# Patient Record
Sex: MALE | Race: WHITE | NOT HISPANIC OR LATINO | Employment: OTHER | URBAN - METROPOLITAN AREA
[De-identification: names, ages, dates, MRNs, and addresses within clinical notes are randomized per-mention and may not be internally consistent; named-entity substitution may affect disease eponyms.]

---

## 2017-03-09 ENCOUNTER — OFFICE VISIT (OUTPATIENT)
Dept: HEMATOLOGY ONCOLOGY | Facility: MEDICAL CENTER | Age: 57
End: 2017-03-09
Payer: MEDICARE

## 2017-03-09 VITALS
WEIGHT: 207.67 LBS | HEIGHT: 68 IN | HEART RATE: 68 BPM | RESPIRATION RATE: 18 BRPM | TEMPERATURE: 98.4 F | OXYGEN SATURATION: 92 % | BODY MASS INDEX: 31.47 KG/M2 | SYSTOLIC BLOOD PRESSURE: 132 MMHG | DIASTOLIC BLOOD PRESSURE: 84 MMHG

## 2017-03-09 DIAGNOSIS — R91.1 PULMONARY NODULE: ICD-10-CM

## 2017-03-09 PROCEDURE — G8417 CALC BMI ABV UP PARAM F/U: HCPCS | Performed by: INTERNAL MEDICINE

## 2017-03-09 PROCEDURE — 99214 OFFICE O/P EST MOD 30 MIN: CPT | Performed by: INTERNAL MEDICINE

## 2017-03-09 PROCEDURE — 3017F COLORECTAL CA SCREEN DOC REV: CPT | Mod: 1P | Performed by: INTERNAL MEDICINE

## 2017-03-09 PROCEDURE — 1036F TOBACCO NON-USER: CPT | Performed by: INTERNAL MEDICINE

## 2017-03-09 PROCEDURE — G8432 DEP SCR NOT DOC, RNG: HCPCS | Performed by: INTERNAL MEDICINE

## 2017-03-09 PROCEDURE — G8482 FLU IMMUNIZE ORDER/ADMIN: HCPCS | Performed by: INTERNAL MEDICINE

## 2017-03-09 RX ORDER — OXYCODONE HYDROCHLORIDE 10 MG/1
TABLET ORAL
COMMUNITY
Start: 2017-02-27 | End: 2018-11-27

## 2017-03-09 ASSESSMENT — PAIN SCALES - GENERAL: PAINLEVEL: NO PAIN

## 2017-03-09 NOTE — PROGRESS NOTES
Follow Up Note: Oncology     Date: 3/9/17  Time: 10:00 am    Primary care physician: Dr. Cartwright  Radiation oncology: Dr. Naqvi  ENT: Dr. Wood  Surgery: Dr. Frias   Pain management: Dr. Briones  Psychiatry: Dr. 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    History of presenting illness:  Mr. Peters is a 56-year-old male history of basal tongue squamous cell carcinoma in 8/2014. At diagnosis he was found to have thyroid nodule and jugulodigastric adenopathy. Ultrasound of the neck showed a 1.5 center meter lesion in the thyroid as well as left cervical adenopathy. He underwent further workup with a PET scan which showed uptake in the mass of the base of the tongue which was causing airway compromise as well as bilateral neck adenopathy. Secondary to airway compromise he required a tracheostomy and a PEG tube placement. He had been on ventilatory support initially long period of time. He underwent biopsy and pathology was positive for moderate to poorly differentiated squamous cell carcinoma and he was cT3 N2c M0, stage FEDERICO, p16 positive. At the time he was on vent support hence underwent induction chemotherapy with DCF ×2 cycles. The therapy he had fluctuating liver enzymes which were followed as well as neutropenic fevers requiring antibiotic treatment. He was then started on chemoradiation with weekly cisplatin. He tolerated the treatment fairly well and completed treatment on 1/30/15. He had very slow recovery but was eventually recovered. Post therapy PET scan in 5/2016 showed response to therapy with diffuse edema in the oropharynx and the hypopharyngeal area with mild residual activity. This was felt to be inflammatory in a chair. Also found to have a 8mm left lower lobe pulmonary nodule which has been stable/improved on repeat CT imaging. He has been getting direct examinations  without any evidence of recurrence. Patient was seen by swallow and speech and had not been cleared for oral intake.    Interval History:  He is here for follow up visit and is accompanied by his wife who is present during the followup visit. He has been feeling fairly well since his last visit. 12/2016 his G-tube fell out. Patient did not call and inform us as he did not want to G-tube placed back. Since then he has been eating and drinking by mouth without much difficulty. He has been very careful about aspiration and denies any cough or choking sensation. He has not been officially cleared by speech or swallowing evaluation. He continues to have low energy but has had improvement since his last visit. He is continued on oxygen at nighttime to 3 L/m. He denies any sore throat but continues to have significant dryness of his mouth which is been bothersome. He continues to have left hand numbness which has been stable and persistent. He continues to have back pain which has slightly improved. He is currently on oxycodone for his back pain.  He has been able to maintain his weight since his last visit. He denies any fevers, chills or night sweats.    Past Medical History:  1. Based of tongue squamous cell carcinoma    2. DM  3. HTN  4. COPD  5. GERD  6. h/o CVA with h/o right side weakness  7. Epidural abscess  8. Coag negative staph bacteremia  9. Paroxysmal A. Fib  10. H/o Methamphetamine abuse    11. h/o C. diff    Allergies:  Review of patient's allergies indicates no known allergies.      Medications:  Current Outpatient Prescriptions on File Prior to Visit   Medication Sig Dispense Refill   • Misc. Devices Misc Jevity 1.2 - Please decrease dose to 5 cans per day for nutritional supplement and continue 4(four) syringes per month.  DX Malignant cancer of base of tongue C01 150 Each 5   • lisinopril (PRINIVIL) 20 MG Tab Take 1 Tab by mouth every day. 30 Tab 3   • doxazosin (CARDURA) 1 MG Tab Take 1 Tab by mouth  every day. 30 Tab 3   • famotidine (PEPCID) 20 MG Tab Take 1 Tab by mouth 2 times a day. 60 Tab 3   • amlodipine (NORVASC) 10 MG Tab Take 1 Tab by mouth every day. Take one (1) by G-tube every day 30 Tab 6   • Misc. Devices Misc DM test strips, lancets #45 and if symptoms.Dispense brand covered by patients insurance,once daily, fasting.Diabetes Mellitus without complications. , E11.9 45 Each 5   • Probiotic Cap Take 1 Capsule by mouth 2 times a day as needed. 60 Cap 3   • hydrOXYzine (VISTARIL) 50 MG Cap Take 50 mg by mouth every bedtime.     • hydrOXYzine (VISTARIL) 25 MG Cap Take 25 mg by mouth 3 times a day as needed for Itching.     • hydrOXYzine (ATARAX) 50 MG Tab Take 50 mg by mouth 3 times a day as needed for Itching.     • sertraline (ZOLOFT) 100 MG Tab 1 Tab by Per G Tube route every day. (Patient not taking: Reported on 3/9/2017) 30 Tab 3   • lorazepam (ATIVAN) 1 MG Tab Take 1 Tab by mouth at bedtime as needed. FOR ANXIETY 30 Tab 0   • busPIRone (BUSPAR) 15 MG tablet 1 Tab by Per G Tube route 3 times a day. (Patient taking differently: 15 mg by Per G Tube route 1 time daily as needed.) 90 Tab 1   • magnesium citrate SOLN Take 60 mL by mouth Once.       No current facility-administered medications on file prior to visit.       Review of Systems:     All other review of systems are negative except what was mentioned above in the HPI.  Constitutional: Negative for fever and chills.  Positive for malaise/fatigue   HENT: Negative for ear pain and  Nosebleeds. Positive for dry mouth  Eyes: Negative for blurred vision.    Respiratory: Negative cough.  Positive for shortness of breath on exertion    Cardiovascular: Negative for chest pain and leg swelling  Gastrointestinal: Negative for nausea and vomiting. Negative for abdominal pain   Genitourinary: Negative for dysuria.    Musculoskeletal: Positive for back pain stable.    Skin: Negative for rash.    Neurological: Negative for dizziness.  Positive for  "neuropathy in left finger tips which has been stable. Positive for diffuse muscle weakness which continues to improve.  Endo/Heme/Allergies: No bruise/bleed easily.    Psychiatric/Behavioral: Positive for depression stable.  Negative for suicidal ideas and memory loss.      Physical Exam:  Vitals:  Filed Vitals:    03/09/17 0958   BP: 132/84   Pulse: 68   Temp: 36.9 °C (98.4 °F)   Resp: 18   Height: 1.727 m (5' 7.99\")   Weight: 94.2 kg (207 lb 10.8 oz)   SpO2: 92%       General: Alert and oriented x 3, not in acute distress     HEENT: normalcephalic, atraumatic, pupils equally reactive to light bilaterally, extraocular muscles intact, oral cavity without any lesions and dry oral mucous membranes.  Neck: Mild restriction of movement.      Lymph nodes: Extensive changes in the neck secondary to treatment.   CVS: regular rate and rhythm   RESP: Clear breath sounds bilateral    ABD: Soft, non tender, non distended, and positive bowel sounds.  EXT: Negative for lower extremity edema     CNS: AO x 3. Diffuse muscle weakness improivng.        Labs:  No visits with results within 1 Day(s) from this visit.  Latest known visit with results is:    Hospital Outpatient Visit on 12/09/2016   Component Date Value Ref Range Status   • WBC 12/09/2016 8.1  4.8 - 10.8 K/uL Final   • RBC 12/09/2016 4.39* 4.70 - 6.10 M/uL Final   • Hemoglobin 12/09/2016 13.6* 14.0 - 18.0 g/dL Final   • Hematocrit 12/09/2016 41.2* 42.0 - 52.0 % Final   • MCV 12/09/2016 93.8  81.4 - 97.8 fL Final   • MCH 12/09/2016 31.0  27.0 - 33.0 pg Final   • MCHC 12/09/2016 33.0* 33.7 - 35.3 g/dL Final   • RDW 12/09/2016 45.7  35.9 - 50.0 fL Final   • Platelet Count 12/09/2016 172  164 - 446 K/uL Final   • MPV 12/09/2016 11.1  9.0 - 12.9 fL Final   • Neutrophils-Polys 12/09/2016 70.20  44.00 - 72.00 % Final   • Lymphocytes 12/09/2016 18.20* 22.00 - 41.00 % Final   • Monocytes 12/09/2016 8.00  0.00 - 13.40 % Final   • Eosinophils 12/09/2016 3.20  0.00 - 6.90 % Final "   • Basophils 12/09/2016 0.20  0.00 - 1.80 % Final   • Immature Granulocytes 12/09/2016 0.20  0.00 - 0.90 % Final   • Nucleated RBC 12/09/2016 0.00   Final   • Neutrophils (Absolute) 12/09/2016 5.66  1.82 - 7.42 K/uL Final    Includes immature neutrophils, if present.   • Lymphs (Absolute) 12/09/2016 1.47  1.00 - 4.80 K/uL Final   • Monos (Absolute) 12/09/2016 0.65  0.00 - 0.85 K/uL Final   • Eos (Absolute) 12/09/2016 0.26  0.00 - 0.51 K/uL Final   • Baso (Absolute) 12/09/2016 0.02  0.00 - 0.12 K/uL Final   • Immature Granulocytes (abs) 12/09/2016 0.02  0.00 - 0.11 K/uL Final   • NRBC (Absolute) 12/09/2016 0.00   Final   • Sodium 12/09/2016 143  135 - 145 mmol/L Final   • Potassium 12/09/2016 3.9  3.6 - 5.5 mmol/L Final   • Chloride 12/09/2016 105  96 - 112 mmol/L Final   • Co2 12/09/2016 33  20 - 33 mmol/L Final   • Anion Gap 12/09/2016 5.0  0.0 - 11.9 Final   • Glucose 12/09/2016 89  65 - 99 mg/dL Final   • Bun 12/09/2016 27* 8 - 22 mg/dL Final   • Creatinine 12/09/2016 1.04  0.50 - 1.40 mg/dL Final   • Calcium 12/09/2016 9.5  8.5 - 10.5 mg/dL Final   • AST(SGOT) 12/09/2016 59* 12 - 45 U/L Final   • ALT(SGPT) 12/09/2016 41  2 - 50 U/L Final   • Alkaline Phosphatase 12/09/2016 173* 30 - 99 U/L Final   • Total Bilirubin 12/09/2016 0.6  0.1 - 1.5 mg/dL Final   • Albumin 12/09/2016 3.9  3.2 - 4.9 g/dL Final   • Total Protein 12/09/2016 7.6  6.0 - 8.2 g/dL Final   • Globulin 12/09/2016 3.7* 1.9 - 3.5 g/dL Final   • A-G Ratio 12/09/2016 1.1   Final   • GFR If  12/09/2016 >60  >60 mL/min/1.73 m 2 Final   • GFR If Non  12/09/2016 >60  >60 mL/min/1.73 m 2 Final   ]      Assessment and Plan:      1.  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 He had good responses to therapy with post treatment imaging showing inflammatory changes. He has  been continued on observation without any evidence of local recurrence or distant metastatic disease. He continues to follow closely with Dr. Naqvi as well as Dr. Wood.    2. Dysphagia: Patient has had prolonged dysphagia and was on PEG tube feeds. In the past and was seen by speech and swallow evaluation and was not cleared for oral intake. 12/2016 his PEG tube fell out and he failed to inform the office as he did not want another PEG tube placed. Since then on his own he has been taking oral liquids and solids. He states that he is tolerating oral feeds well and denies any dysphagia or aspiration. I strongly recommend that [facility] officially evaluated by speech and follow however patient is refusing to see them. I went over the concerns associated with premature oral intake such as aspiration pneumonia. He fully understands and states that he is being very careful and he does not want to see speech and swallow and even if he fails evaluation he does not want another PEG tube placed.    3. Pulmonary nodules: Patient had been found to have pulmonary nodules on imaging area and repeat CT scans have been stable/decreasing. Recommend repeat CT scan in 8/2017.    4. Ascitic anemia: Patient had pancytopenia which was persistent. Most recent labs showed improvement with low hemoglobin which has been stable. He does not have any active signs of bleeding.    5. Liver changes: Patient was found to have elevation of his liver enzymes during his therapy. There were fluctuating. On imaging he was also found to have changes concerning for cirrhosis and portal hypertension. He has been closely by his primary care physician. His liver enzymes have improved.    6. Constipation: Likely secondary to oxycodone. Patient is currently having a bowel movement once a week. I went over bowel regimen and recommended that he take Amelia-Colace twice a day as well as milk of magnesium and magnesium citrate during the day to maintain at least  one bowel movement a day. Us this further with his pain management physician.    7. Repeat CT scan and labs to be done in 8/2017 have been ordered. He is to follow post imaging or sooner as needed.    He agreed and verbalized his agreement and understanding with the current plan.  I answered all questions and concerns he has at this time and advised him to call at any time in the interim with questions or concerns in regards to his care.  Thank you for allowing me to participate in his care, I will continue to follow.    Please note that this dictation was created using voice recognition software. I have made every reasonable attempt to correct obvious errors, but I expect that there are errors of grammar and possibly content that I did not discover before finalizing the note.

## 2017-03-09 NOTE — MR AVS SNAPSHOT
"        Neptali Peters   3/9/2017 10:00 AM   Office Visit   MRN: [id number]    Department:  [facility]   Dept Phone:  [phone]    Description:  Male : 1960   Provider:  Milla Cook M.D.           Reason for Visit     Follow-Up           Allergies as of 3/9/2017     No Known Allergies      You were diagnosed with     Pulmonary nodule   [[id number]]         Vital Signs     Blood Pressure Pulse Temperature Respirations Height Weight    132/84 mmHg 68 36.9 °C (98.4 °F) 18 1.727 m (5' 7.99\") 94.2 kg (207 lb 10.8 oz)    Body Mass Index Oxygen Saturation Smoking Status             31.58 kg/m2 92% Never Smoker          Basic Information     Date Of Birth Sex Race Ethnicity Preferred Language    1960 Male White Non- English      Your appointments     Mar 14, 2017  3:30 PM   Follow Up with Sangeeta MARTINEZ M.D.   [facility] Radiation Therapy (--)    [address] [facility] [address]   [phone]            Aug 07, 2017  9:00 AM   CT BODY WITH with [address] 1   [facility] IMAGING - CT - [address])    [address] [facility] [id number] [phone]           Taking medications as regularly scheduled is strongly encouraged.  *For Abdominal CT-Patient needs to  oral contrast and instruction from the department at least 2 hours prior to exam. Patient may  contrast at any imaging facility.            Aug 11, 2017  9:00 AM   ONCOLOGY EST PATIENT 30 MIN with Milla Cook M.D.   [facility] (--)    [address], [address]  [facility] [id number] [phone]              Problem List              ICD-10-CM Priority Class Noted - Resolved    Methamphetamine use-QUIT in 10/10 F15.10 Low  2010 - Present    HTN (hypertension) I10 Low  2010 - Present    Asymmetric septal hypertrophy (CMS-HCC) I42.2 Low  2010 - Present    Vitamin D deficiency E55.9   2011 - Present    COPD (chronic obstructive pulmonary disease) MILD J44.9 Low  2011 " - Present    Renal cyst N28.1   7/5/2011 - Present    Hyperlipidemia E78.5 Medium  11/29/2011 - Present    Chronic low back pain M54.5, G89.29 Medium  1/10/2012 - Present    Hx of Abnormal chest CT R93.8   1/10/2012 - Present    Left ventricular hypertrophy I51.7   1/10/2012 - Present    H/O: CVA (cerebrovascular accident) Z86.73 Low  10/1/2013 - Present    PAF (paroxysmal atrial fibrillation) (CMS-HCC) I48.0 Low  10/30/2013 - Present    Esophageal reflux K21.9 Low  10/8/2014 - Present    Pancytopenia (CMS-HCC) D61.818 High  1/31/2015 - Present    Chronic diastolic CHF (congestive heart failure) (CMS-HCC) I50.32 Medium  2/1/2015 - Present    DM type 2 (diabetes mellitus, type 2) (CMS-HCC) E11.9 Medium  2/1/2015 - Present    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 High  2/1/2015 - Present    Anemia D64.9   2/19/2015 - Present    Hx of Tracheostomy status Z93.0   5/13/2015 - Present    Anxiety F41.9   7/29/2015 - Present    Malignant neoplasm of base of tongue (CMS-HCC) C01   8/12/2015 - Present    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   8/12/2015 - Present    Hx of sleep apnea Z87.09   8/19/2015 - Present    Frequent urination R35.0   9/14/2015 - Present    Lumbosacral spondylosis M47.817   10/15/2015 - Present    DDD (degenerative disc disease), lumbar M51.36   10/15/2015 - Present    Muscle spasm of back M62.830   10/15/2015 - Present    Oxygen dependent 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.81   2/17/2016 - Present    Controlled substance agreement signed Z79.899   4/19/2016 - Present    Excessive flatus R14.3   4/19/2016 - Present    Elevated LFTs R79.89   7/6/2016 - Present    BMI 32.0-32.9,adult Z68.32   10/6/2016 - Present      Health Maintenance        Date Due Completion Dates    IMM HEP B VACCINE (1 of 3 - Primary Series) 1960 ---    IMM DTaP/Tdap/Td Vaccine (1 - Tdap) 6/30/1980 ---    RETINAL SCREENING 10/18/2013 10/18/2012 (Declined)    Override on 10/18/2012: Patient Declined (pt refused due to financial constraints)    DIABETES  MONOFILAMENT / LE EXAM 10/18/2013 10/18/2012 (Done)    Override on 10/18/2012: Done    URINE ACR / MICROALBUMIN 8/13/2014 8/13/2013, 7/2/2012, 10/18/2011    A1C SCREENING 10/14/2015 4/14/2015, 11/1/2014, 11/12/2013, 8/13/2013, 2/27/2013, 10/16/2012, 6/7/2012, 1/4/2012, 8/1/2011, 12/27/2010    FASTING LIPID PROFILE 12/11/2016 12/11/2015, 8/13/2013, 2/27/2013, 10/16/2012, 6/7/2012, 1/4/2012, 6/17/2011, 12/27/2010    SERUM CREATININE 12/9/2017 12/9/2016, 9/9/2016, 8/4/2016, 7/8/2016, 6/10/2016, 5/13/2016, 4/8/2016, 3/11/2016, 2/12/2016, 1/8/2016, 12/11/2015, 11/13/2015, 10/9/2015, 9/11/2015, 8/14/2015, 7/17/2015, 6/19/2015, 5/22/2015, 4/24/2015, 3/27/2015, 3/17/2015, 3/9/2015, 3/7/2015, 3/3/2015, 3/2/2015, 2/25/2015, 2/22/2015, 2/11/2015, 2/3/2015, 2/1/2015, 1/31/2015, 1/30/2015, 1/22/2015, 1/22/2015, 1/16/2015, 1/16/2015, 1/9/2015, 12/31/2014, 12/22/2014, 12/21/2014, 12/20/2014, 12/19/2014, 12/18/2014, 12/17/2014, 12/16/2014, 12/15/2014, 12/14/2014, 12/13/2014, 12/12/2014, 12/11/2014, 12/10/2014, 12/9/2014, 12/8/2014, 12/7/2014, 12/5/2014, 12/5/2014, 12/1/2014, 11/29/2014, 11/28/2014, 11/26/2014, 11/24/2014, 11/22/2014, 11/21/2014, 11/17/2014, 11/13/2014, 11/10/2014, 11/9/2014, 11/4/2014, 11/3/2014, 11/2/2014, 11/1/2014, 10/31/2014, 10/30/2014, 10/29/2014, 10/28/2014, 10/27/2014, 10/26/2014, 10/25/2014, 10/24/2014, 10/23/2014, 10/22/2014, 10/21/2014, 10/20/2014, 10/19/2014, 10/18/2014, 10/17/2014, 10/16/2014, 10/15/2014, 10/14/2014, 10/13/2014, 10/12/2014, 10/11/2014, 10/10/2014, 10/9/2014, 10/8/2014, 10/2/2014, 4/23/2014, 12/10/2013, 12/3/2013, 11/26/2013, 11/19/2013, 11/12/2013, 11/10/2013, 11/6/2013, 11/4/2013, 11/2/2013, 10/31/2013, 10/30/2013, 8/13/2013, 7/20/2013, 2/27/2013, 11/1/2012, 10/31/2012, 10/16/2012, 7/2/2012, 6/7/2012, 1/4/2012, 11/26/2011, 10/18/2011, 8/5/2011, 8/1/2011, 6/17/2011, 4/28/2011, 4/27/2011, 12/29/2010, 12/28/2010, 12/27/2010, 12/26/2010    COLONOSCOPY 10/18/2022 10/18/2012 (Declined)     Override on 10/18/2012: Patient Declined (refused due to lack of insurance/finacial constraints)            Current Immunizations     Influenza TIV (IM) 10/31/2013  5:43 AM    Influenza Vaccine Quad Inj (Pf) 10/10/2014  2:30 AM    Influenza Vaccine Quad Inj (Preserved) 10/6/2016, 10/14/2015    Pneumococcal polysaccharide vaccine (PPSV-23) 10/31/2013  5:51 AM      Below and/or attached are the medications your provider expects you to take. Review all of your home medications and newly ordered medications with your provider and/or pharmacist. Follow medication instructions as directed by your provider and/or pharmacist. Please keep your medication list with you and share with your provider. Update the information when medications are discontinued, doses are changed, or new medications (including over-the-counter products) are added; and carry medication information at all times in the event of emergency situations     Allergies:  No Known Allergies          Medications  Valid as of: March 09, 2017 - 10:36 AM    Generic Name Brand Name Tablet Size Instructions for use    AmLODIPine Besylate (Tab) NORVASC 10 MG Take 1 Tab by mouth every day. Take one (1) by G-tube every day        BusPIRone HCl (Tab) BUSPAR 15 MG 1 Tab by Per G Tube route 3 times a day.        Doxazosin Mesylate (Tab) CARDURA 1 MG Take 1 Tab by mouth every day.        Famotidine (Tab) PEPCID 20 MG Take 1 Tab by mouth 2 times a day.        HydrOXYzine HCl (Tab) ATARAX 50 MG Take 50 mg by mouth 3 times a day as needed for Itching.        HydrOXYzine Pamoate (Cap) VISTARIL 25 MG Take 25 mg by mouth 3 times a day as needed for Itching.        HydrOXYzine Pamoate (Cap) VISTARIL 50 MG Take 50 mg by mouth every bedtime.        Lisinopril (Tab) PRINIVIL 20 MG Take 1 Tab by mouth every day.        LORazepam (Tab) ATIVAN 1 MG Take 1 Tab by mouth at bedtime as needed. FOR ANXIETY        Magnesium Citrate (Solution) magnesium citrate  Take 60 mL by mouth Once.          Misc. Devices (Misc) Misc. Devices  DM test strips, lancets #45 and if symptoms.Dispense brand covered by patients insurance,once daily, fasting.Diabetes Mellitus without complications. , E11.9        Misc. Devices (Misc) Misc. Devices  Jevity 1.2 - Please decrease dose to 5 cans per day for nutritional supplement and continue 4(four) syringes per month.  DX Malignant cancer of base of tongue C01        OxyCODONE HCl (Tab) ROXICODONE 10 MG         Probiotic Product (Cap) Probiotic  Take 1 Capsule by mouth 2 times a day as needed.        Sertraline HCl (Tab) ZOLOFT 100 MG 1 Tab by Per G Tube route every day.        .                 Medicines prescribed today were sent to:     [facility] [address] [place] [address] - [address] [place] [address] [place] [address]    Phone: [phone] Fax: [phone]    Open 24 Hours?: No      Medication refill instructions:       If your prescription bottle indicates you have medication refills left, it is not necessary to call your provider’s office. Please contact your pharmacy and they will refill your medication.    If your prescription bottle indicates you do not have any refills left, you may request refills at any time through one of the following ways: The online [facility] system (except Urgent Care), by calling your provider’s office, or by asking your pharmacy to contact your provider’s office with a refill request. Medication refills are processed only during regular business hours and may not be available until the next business day. Your provider may request additional information or to have a follow-up visit with you prior to refilling your medication.   *Please Note: Medication refills are assigned a new Rx number when refilled electronically. Your pharmacy may indicate that no refills were authorized even though a new prescription for the same medication is available at the pharmacy. Please request the medicine by name with the pharmacy before  contacting your provider for a refill.        Your To Do List     Future Labs/Procedures Complete By Expires    CT-CHEST,ABDOMEN,PELVIS WITH  8/7/2017 3/9/2018    CBC WITH DIFFERENTIAL  As directed 3/9/2018    COMP METABOLIC PANEL  As directed 3/9/2018      Referral     A referral request has been sent to our patient care coordination department. Please allow 3-5 business days for us to process this request and contact you either by phone or mail. If you do not hear from us by the 5th business day, please call us at [phone].        Other Notes About Your Plan     8/12/16 DR Thomas appt.  6/10/16 DR Cook Apptt- Elevated LFT's consistent w Cirhosis, May need GI referral, Swallow eval, continue PEG tube feedings.f/u 3 months.  3/11/16 Nikki Schaffer (Oncology Coordinator/APRN) Appt  3/7/16 OPO Report completed -[facility]  1/8/16 Dr Wood (ENT) Follow up appt.  FALL RISK ASSESSMENT COMPLETED 7/29/15  10/15/15 Pain Management Encounter-Dr Anselmo encinas r Lumbosacral Spondylosis, Lumbar DDD, Chronic Back Pain, Muscle Leg Spasms.  6/19/15 Dr Driss URBANO Encounter: PET Scan and video reviewed, No visible tumor noted, Suspect PET Findings are inflammatory. Recommend f//u with Dr Wood and Dr Naqvi. Start on Nystatin for extensive oropharyngeal candidiasis ( 10 day course).  4/15/15  [facility] Modified Barium Swallow Study Report- Impression: Severe Pharyngeal/Esophageal Dysphagia. Ascending and Descending Aspiration of Thin and Thick Liquids. Poor ability to protect airway. Senses aspiration but unable to eject.  Recommendations: Diet NPO of solids or liquids, Use G-Tube. Medications thru G-tube. May benefit from referral to GI MD.  4/9/15 [facility] (Nikki Schaffer) Note: Observation Status r/e head and neck cancer. PET CT is due in early June w/ f/u with Dr. Naqvi 6/11/15. Swallow Eval scheduled for 4/11/15. (Dr. Wood ENT),  Speech therapist twice/wk,   4/8/15 TSH= 1.100 ( .300-3.700)           [facility]  Access Code: Activation code not generated  Current MyChart Status: Active

## 2017-03-09 NOTE — Clinical Note
[facility]    [address], [address]  [place] [address]        Date:3/9/2017                     Reference to Neptali Peters    Follow Up Note: Oncology       Date: 3/9/17  Time: 10:00 am      Primary care physician: Dr. Cartwright  Radiation oncology: Dr. Naqvi  ENT: Dr. oWod  Surgery: Dr. Frias   Pain management: Dr. Briones  Psychiatry: Dr. 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      History of presenting illness:  Mr. Peters is a 56-year-old male history of basal tongue squamous cell carcinoma in 8/2014. At diagnosis he was found to have thyroid nodule and jugulodigastric adenopathy. Ultrasound of the neck showed a 1.5 center meter lesion in the thyroid as well as left cervical adenopathy. He underwent further workup with a PET scan which showed uptake in the mass of the base of the tongue which was causing airway compromise as well as bilateral neck adenopathy. Secondary to airway compromise he required a tracheostomy and a PEG tube placement. He had been on ventilatory support initially long period of time. He underwent biopsy and pathology was positive for moderate to poorly differentiated squamous cell carcinoma and he was cT3 N2c M0, stage FEDERICO, p16 positive. At the time he was on vent support hence underwent induction chemotherapy with DCF ×2 cycles. The therapy he had fluctuating liver enzymes which were followed as well as neutropenic fevers requiring antibiotic treatment. He was then started on chemoradiation with weekly cisplatin. He tolerated the treatment fairly well and completed treatment on 1/30/15. He had very slow recovery but was eventually recovered. Post therapy PET scan in 5/2016 showed response to therapy with diffuse edema in the oropharynx and the hypopharyngeal area with mild residual activity. This was felt to be  inflammatory in a chair. Also found to have a 8mm left lower lobe pulmonary nodule which has been stable/improved on repeat CT imaging. He has been getting direct examinations without any evidence of recurrence. Patient was seen by swallow and speech and had not been cleared for oral intake.      Interval History:  He is here for follow up visit and is accompanied by his wife who is present during the followup visit. He has been feeling fairly well since his last visit. 12/2016 his G-tube fell out. Patient did not call and inform us as he did not want to G-tube placed back. Since then he has been eating and drinking by mouth without much difficulty. He has been very careful about aspiration and denies any cough or choking sensation. He has not been officially cleared by speech or swallowing evaluation. He continues to have low energy but has had improvement since his last visit. He is continued on oxygen at nighttime to 3 L/m. He denies any sore throat but continues to have significant dryness of his mouth which is been bothersome. He continues to have left hand numbness which has been stable and persistent. He continues to have back pain which has slightly improved. He is currently on oxycodone for his back pain.  He has been able to maintain his weight since his last visit. He denies any fevers, chills or night sweats.      Past Medical History:  Based of tongue squamous cell carcinoma    DM  HTN  COPD  GERD  h/o CVA with h/o right side weakness  Epidural abscess  Coag negative staph bacteremia  Paroxysmal A. Fib  H/o Methamphetamine abuse    h/o C. diff      Allergies:  Review of patient's allergies indicates no known allergies.          Medications:  Current Outpatient Prescriptions on File Prior to Visit    Medication  Sig  Dispense  Refill    •  Misc. Devices Misc  Jevity 1.2 - Please decrease dose to 5 cans per day for nutritional supplement and continue 4(four) syringes per month.   DX Malignant cancer of  base of tongue C01  150 Each  5    •  lisinopril (PRINIVIL) 20 MG Tab  Take 1 Tab by mouth every day.  30 Tab  3    •  doxazosin (CARDURA) 1 MG Tab  Take 1 Tab by mouth every day.  30 Tab  3    •  famotidine (PEPCID) 20 MG Tab  Take 1 Tab by mouth 2 times a day.  60 Tab  3    •  amlodipine (NORVASC) 10 MG Tab  Take 1 Tab by mouth every day. Take one (1) by G-tube every day  30 Tab  6    •  Misc. Devices Misc  DM test strips, lancets #45 and if symptoms.Dispense brand covered by patients insurance,once daily, fasting.Diabetes Mellitus without complications. , E11.9  45 Each  5    •  Probiotic Cap  Take 1 Capsule by mouth 2 times a day as needed.  60 Cap  3    •  hydrOXYzine (VISTARIL) 50 MG Cap  Take 50 mg by mouth every bedtime.        •  hydrOXYzine (VISTARIL) 25 MG Cap  Take 25 mg by mouth 3 times a day as needed for Itching.        •  hydrOXYzine (ATARAX) 50 MG Tab  Take 50 mg by mouth 3 times a day as needed for Itching.        •  sertraline (ZOLOFT) 100 MG Tab  1 Tab by Per G Tube route every day. (Patient not taking: Reported on 3/9/2017)  30 Tab  3    •  lorazepam (ATIVAN) 1 MG Tab  Take 1 Tab by mouth at bedtime as needed. FOR ANXIETY  30 Tab  0    •  busPIRone (BUSPAR) 15 MG tablet  1 Tab by Per G Tube route 3 times a day. (Patient taking differently: 15 mg by Per G Tube route 1 time daily as needed.)  90 Tab  1    •  magnesium citrate SOLN  Take 60 mL by mouth Once.              No current facility-administered medications on file prior to visit.          Review of Systems:     All other review of systems are negative except what was mentioned above in the HPI.  Constitutional: Negative for fever and chills.  Positive for malaise/fatigue   HENT: Negative for ear pain and  Nosebleeds. Positive for dry mouth  Eyes: Negative for blurred vision.    Respiratory: Negative cough.  Positive for shortness of breath on exertion    Cardiovascular: Negative for chest pain and leg swelling  Gastrointestinal: Negative  "for nausea and vomiting. Negative for abdominal pain   Genitourinary: Negative for dysuria.    Musculoskeletal: Positive for back pain stable.    Skin: Negative for rash.    Neurological: Negative for dizziness.  Positive for neuropathy in left finger tips which has been stable. Positive for diffuse muscle weakness which continues to improve.  Endo/Heme/Allergies: No bruise/bleed easily.    Psychiatric/Behavioral: Positive for depression stable.  Negative for suicidal ideas and memory loss.        Physical Exam:  Vitals:   Vitals   Filed Vitals:      03/09/17 0958    BP:  132/84    Pulse:  68    Temp:  36.9 °C (98.4 °F)    Resp:  18    Height:  1.727 m (5' 7.99\")    Weight:  94.2 kg (207 lb 10.8 oz)    SpO2:  92%             General: Alert and oriented x 3, not in acute distress     HEENT: normalcephalic, atraumatic, pupils equally reactive to light bilaterally, extraocular muscles intact, oral cavity without any lesions and dry oral mucous membranes.  Neck: Mild restriction of movement.      Lymph nodes: Extensive changes in the neck secondary to treatment.   CVS: regular rate and rhythm   RESP: Clear breath sounds bilateral    ABD: Soft, non tender, non distended, and positive bowel sounds.  EXT: Negative for lower extremity edema     CNS: AO x 3. Diffuse muscle weakness improivng.          Labs:  No visits with results within 1 Day(s) from this visit.  Latest known visit with results is:      Hospital Outpatient Visit on 12/09/2016    Component  Date  Value  Ref Range  Status    •  WBC  12/09/2016  8.1   4.8 - 10.8 K/uL  Final    •  RBC  12/09/2016  4.39*  4.70 - 6.10 M/uL  Final    •  Hemoglobin  12/09/2016  13.6*  14.0 - 18.0 g/dL  Final    •  Hematocrit  12/09/2016  41.2*  42.0 - 52.0 %  Final    •  MCV  12/09/2016  93.8   81.4 - 97.8 fL  Final    •  MCH  12/09/2016  31.0   27.0 - 33.0 pg  Final    •  MCHC  12/09/2016  33.0*  33.7 - 35.3 g/dL  Final    •  RDW  12/09/2016  45.7   35.9 - 50.0 fL  Final    •  " Platelet Count  12/09/2016  172   164 - 446 K/uL  Final    •  MPV  12/09/2016  11.1   9.0 - 12.9 fL  Final    •  Neutrophils-Polys  12/09/2016  70.20   44.00 - 72.00 %  Final    •  Lymphocytes  12/09/2016  18.20*  22.00 - 41.00 %  Final    •  Monocytes  12/09/2016  8.00   0.00 - 13.40 %  Final    •  Eosinophils  12/09/2016  3.20   0.00 - 6.90 %  Final    •  Basophils  12/09/2016  0.20   0.00 - 1.80 %  Final    •  Immature Granulocytes  12/09/2016  0.20   0.00 - 0.90 %  Final    •  Nucleated RBC  12/09/2016  0.00     Final    •  Neutrophils (Absolute)  12/09/2016  5.66   1.82 - 7.42 K/uL  Final      Includes immature neutrophils, if present.    •  Lymphs (Absolute)  12/09/2016  1.47   1.00 - 4.80 K/uL  Final    •  Monos (Absolute)  12/09/2016  0.65   0.00 - 0.85 K/uL  Final    •  Eos (Absolute)  12/09/2016  0.26   0.00 - 0.51 K/uL  Final    •  Baso (Absolute)  12/09/2016  0.02   0.00 - 0.12 K/uL  Final    •  Immature Granulocytes (abs)  12/09/2016  0.02   0.00 - 0.11 K/uL  Final    •  NRBC (Absolute)  12/09/2016  0.00     Final    •  Sodium  12/09/2016  143   135 - 145 mmol/L  Final    •  Potassium  12/09/2016  3.9   3.6 - 5.5 mmol/L  Final    •  Chloride  12/09/2016  105   96 - 112 mmol/L  Final    •  Co2  12/09/2016  33   20 - 33 mmol/L  Final    •  Anion Gap  12/09/2016  5.0   0.0 - 11.9  Final    •  Glucose  12/09/2016  89   65 - 99 mg/dL  Final    •  Bun  12/09/2016  27*  8 - 22 mg/dL  Final    •  Creatinine  12/09/2016  1.04   0.50 - 1.40 mg/dL  Final    •  Calcium  12/09/2016  9.5   8.5 - 10.5 mg/dL  Final    •  AST(SGOT)  12/09/2016  59*  12 - 45 U/L  Final    •  ALT(SGPT)  12/09/2016  41   2 - 50 U/L  Final    •  Alkaline Phosphatase  12/09/2016  173*  30 - 99 U/L  Final    •  Total Bilirubin  12/09/2016  0.6   0.1 - 1.5 mg/dL  Final    •  Albumin  12/09/2016  3.9   3.2 - 4.9 g/dL  Final    •  Total Protein  12/09/2016  7.6   6.0 - 8.2 g/dL  Final    •  Globulin  12/09/2016  3.7*  1.9 - 3.5 g/dL  Final    •   A-G Ratio  12/09/2016  1.1     Final    •  GFR If   12/09/2016  >60   >60 mL/min/1.73 m 2  Final    •  GFR If Non   12/09/2016  >60   >60 mL/min/1.73 m 2  Final      ]          Assessment and Plan:      1.  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 He had good responses to therapy with post treatment imaging showing inflammatory changes. He has been continued on observation without any evidence of local recurrence or distant metastatic disease. He continues to follow closely with Dr. Naqvi as well as Dr. Wood.      2. Dysphagia: Patient has had prolonged dysphagia and was on PEG tube feeds. In the past and was seen by speech and swallow evaluation and was not cleared for oral intake. 12/2016 his PEG tube fell out and he failed to inform the office as he did not want another PEG tube placed. Since then on his own he has been taking oral liquids and solids. He states that he is tolerating oral feeds well and denies any dysphagia or aspiration. I strongly recommend that B [facility] officially evaluated by speech and follow however patient is refusing to see them. I went over the concerns associated with premature oral intake such as aspiration pneumonia. He fully understands and states that he is being very careful and he does not want to see speech and swallow and even if he fails evaluation he does not want another PEG tube placed.      3. Pulmonary nodules: Patient had been found to have pulmonary nodules on imaging area and repeat CT scans have been stable/decreasing. Recommend repeat CT scan in 8/2017.      4. Ascitic anemia: Patient had pancytopenia which was persistent. Most recent labs showed improvement with low hemoglobin which has been stable. He does not have any active signs of bleeding.      5. Liver changes: Patient was  found to have elevation of his liver enzymes during his therapy. There were fluctuating. On imaging he was also found to have changes concerning for cirrhosis and portal hypertension. He has been closely by his primary care physician. His liver enzymes have improved.      6. Constipation: Likely secondary to oxycodone. Patient is currently having a bowel movement once a week. I went over bowel regimen and recommended that he take Amelia-Colace twice a day as well as milk of magnesium and magnesium citrate during the day to maintain at least one bowel movement a day. Us this further with his pain management physician.      7. Repeat CT scan and labs to be done in 8/2017 have been ordered. He is to follow post imaging or sooner as needed.      He agreed and verbalized his agreement and understanding with the current plan.  I answered all questions and concerns he has at this time and advised him to call at any time in the interim with questions or concerns in regards to his care.  Thank you for allowing me to participate in his care, I will continue to follow.      Please note that this dictation was created using voice recognition software. I have made every reasonable attempt to correct obvious errors, but I expect that there are errors of grammar and possibly content that I did not discover before finalizing the note.      Sincerely,  Milla Cook  [address] [place] [address], [address]   [phone]

## 2017-03-14 ENCOUNTER — HOSPITAL ENCOUNTER (OUTPATIENT)
Dept: RADIATION ONCOLOGY | Facility: MEDICAL CENTER | Age: 57
End: 2017-03-31
Attending: RADIOLOGY
Payer: MEDICARE

## 2017-03-14 VITALS
SYSTOLIC BLOOD PRESSURE: 150 MMHG | HEART RATE: 88 BPM | TEMPERATURE: 97.5 F | BODY MASS INDEX: 30.72 KG/M2 | DIASTOLIC BLOOD PRESSURE: 77 MMHG | OXYGEN SATURATION: 94 % | WEIGHT: 202 LBS

## 2017-03-14 PROCEDURE — 31575 DIAGNOSTIC LARYNGOSCOPY: CPT | Performed by: RADIOLOGY

## 2017-03-14 PROCEDURE — 99212 OFFICE O/P EST SF 10 MIN: CPT | Mod: 25 | Performed by: RADIOLOGY

## 2017-03-14 PROCEDURE — 99214 OFFICE O/P EST MOD 30 MIN: CPT | Mod: 25 | Performed by: RADIOLOGY

## 2017-03-14 NOTE — PROGRESS NOTES
RADIATION ONCOLOGY FOLLOW-UP    DATE OF SERVICE:   3/14/2017    IDENTIFICATION:   A 56 y.o. 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 He presented with airway compromise requiring emergent trach and ventilator support. He received 1 cycle of induction chemotherapy to allow weaning off ventilator followed by concurrent chemoradiotherapy cisplatin-based 70 grade 35 fractions 45 elapsed days completed January 30, 2015.        HISTORY OF PRESENT ILLNESS:   Since his last visit on August 12, 2016 he reports his PEG tube has fallen out. He does not want it replaced. He is able to eat both solids and drink liquids but liquids need to be thickened. He does have some difficulty swallowing meats.    Xerostomia 2 ( 0-10, 0=completely dry), Taste 5( 0-10, 0=no taste), Dysphagia 5, Odynophagia 0    CURRENT MEDICATIONS:  Current Outpatient Prescriptions   Medication Sig Dispense Refill   • oxycodone immediate release (ROXICODONE) 10 MG immediate release tablet      • lisinopril (PRINIVIL) 20 MG Tab Take 1 Tab by mouth every day. 30 Tab 3   • doxazosin (CARDURA) 1 MG Tab Take 1 Tab by mouth every day. 30 Tab 3   • famotidine (PEPCID) 20 MG Tab Take 1 Tab by mouth 2 times a day. 60 Tab 3   • amlodipine (NORVASC) 10 MG Tab Take 1 Tab by mouth every day. Take one (1) by G-tube every day 30 Tab 6   • Probiotic Cap Take 1 Capsule by mouth 2 times a day as needed. 60 Cap 3     No current facility-administered medications for this encounter.       ALLERGIES:  Review of patient's allergies indicates no known allergies.    PHYSICAL EXAM:   /77 mmHg  Pulse 88  Temp(Src) 36.4 °C (97.5 °F)  Wt 91.627 kg (202 lb)  SpO2 94%  GENERAL: Alert and oriented no acute distress  HEENT:  Pupils are equal, round, and reactive to light.  Extraocular muscles   are intact. Sclerae nonicteric.  Conjunctivae pink.  Oral cavity, tongue   protrudes midline.   NECK: Mild induration and lymphedema  bilaterally.  NODES:  No peripheral adenopathy of the neck, supraclavicular fossa or axillae   bilaterally.  LUNGS:  Clear to ascultation and resonant to percussion.  HEART:  Regular rate and rhythm.  No murmur appreciated      Pain Scale: 0-10  Pain Assessement: Initial  Pain Location, Orientation and Scale: Back: Mid : Chronic  : 8 (patient has an appt with pain management tomorrow to refill pain meds.  What makes the pain better: pain medication  What makes the pain worse: activity    FIBEROPTIC EXAM:  Flexible fiberoptic exam after anesthesia of left nostril and oropharynx  demostrated normal nasopharyngeal structures including the   opening of the eustachian canal, torus and fossa of Rosenmuller bilaterally.    In the oropharynx, the base of tongue, vallecula, epiglottis, PE folds appear   normal. Epiglottis is omega shaped.  Laryngeal structures: the AE folds, false vocal folds, arytenoids   appeared normal.  Cords meet at midline on the phonation of vowel E.  Piriform  sinuses showed no masses.    IMPRESSION:    A 56 y.o. with stage IV a squamous cell carcinoma base of tongue, P16 positive. Clinically without evidence of disease.    RECOMMENDATIONS:   Reviewed the laryngoscopic exam with patient reassured him. He is now 2 years post therapy. Recommended alternating yearly follow-ups between myself and Dr. Wood. He'll return for follow-up in 1 year.    25 minutes was spent face-to-face with patient in the office and more than half of that time was spent counseling patient or coordinating care as described above.    Thank you for the opportunity to participate in his care.  If any questions or comments, please do not hesitate in calling.    Sangeeta MARTINEZ M.D.  Electronically signed by: Sangeeta Naqvi V, 3/14/2017 4:08 PM  [phone]

## 2017-03-29 RX ORDER — FAMOTIDINE 20 MG/1
TABLET, FILM COATED ORAL
Qty: 60 TAB | Refills: 2 | Status: SHIPPED | OUTPATIENT
Start: 2017-03-29 | End: 2017-07-31 | Stop reason: SDUPTHER

## 2017-03-29 NOTE — TELEPHONE ENCOUNTER
Was the patient seen in the last year in this department? Yes     Does patient have an active prescription for medications requested? No     Received Request Via: Pharmacy   Future Appointments       Provider Department [place]    8/7/2017 9:00 AM [address] [facility] - CT - [address] [facility]    8/11/2017 9:00 AM Milla Cook [facility]

## 2017-03-30 RX ORDER — LISINOPRIL 20 MG/1
TABLET ORAL
Qty: 30 TAB | Refills: 5 | Status: SHIPPED | OUTPATIENT
Start: 2017-03-30 | End: 2017-08-08 | Stop reason: SDUPTHER

## 2017-05-01 RX ORDER — DOXAZOSIN MESYLATE 1 MG/1
TABLET ORAL
Qty: 30 TAB | Refills: 2 | Status: SHIPPED | OUTPATIENT
Start: 2017-05-01 | End: 2017-07-31 | Stop reason: SDUPTHER

## 2017-05-01 NOTE — TELEPHONE ENCOUNTER
Was the patient seen in the last year in this department? Yes     Does patient have an active prescription for medications requested? No     Received Request Via: Pharmacy   Future Appointments       Provider Department [place]    8/7/2017 9:00 AM [address] [facility] - CT - [address] [facility]    8/11/2017 9:00 AM Milla Cook M[facility]

## 2017-05-31 ENCOUNTER — PATIENT OUTREACH (OUTPATIENT)
Dept: HEALTH INFORMATION MANAGEMENT | Facility: OTHER | Age: 57
End: 2017-05-31

## 2017-05-31 NOTE — PROGRESS NOTES
Outcome: Left Message    WebIZ Checked & Epic Updated:  no    HealthConnect Verified: no    Attempt # 1

## 2017-07-10 ENCOUNTER — TELEPHONE (OUTPATIENT)
Dept: HEMATOLOGY ONCOLOGY | Facility: MEDICAL CENTER | Age: 57
End: 2017-07-10

## 2017-07-10 NOTE — TELEPHONE ENCOUNTER
1st attempt to contact pt- need to transition care from Dr Cook to Dr. Fowler or Dr. England.  LM on VM for pt to return our call.

## 2017-07-31 RX ORDER — AMLODIPINE BESYLATE 10 MG/1
TABLET ORAL
Qty: 30 TAB | Refills: 5 | Status: SHIPPED | OUTPATIENT
Start: 2017-07-31 | End: 2018-06-28 | Stop reason: SDUPTHER

## 2017-07-31 RX ORDER — FAMOTIDINE 20 MG/1
TABLET, FILM COATED ORAL
Qty: 60 TAB | Refills: 5 | Status: SHIPPED | OUTPATIENT
Start: 2017-07-31 | End: 2018-11-27 | Stop reason: SDUPTHER

## 2017-07-31 RX ORDER — DOXAZOSIN MESYLATE 1 MG/1
TABLET ORAL
Qty: 30 TAB | Refills: 5 | Status: SHIPPED | OUTPATIENT
Start: 2017-07-31 | End: 2018-01-31 | Stop reason: SDUPTHER

## 2017-07-31 NOTE — TELEPHONE ENCOUNTER
Was the patient seen in the last year in this department? Yes     Does patient have an active prescription for medications requested? No     Received Request Via: Pharmacy   Future Appointments       Provider Department [place]    8/11/2017 9:00 AM Francie Fowler M[facility]

## 2017-08-08 ENCOUNTER — OFFICE VISIT (OUTPATIENT)
Dept: MEDICAL GROUP | Facility: MEDICAL CENTER | Age: 57
End: 2017-08-08
Attending: NURSE PRACTITIONER
Payer: MEDICARE

## 2017-08-08 VITALS
HEART RATE: 64 BPM | TEMPERATURE: 97.9 F | OXYGEN SATURATION: 94 % | DIASTOLIC BLOOD PRESSURE: 66 MMHG | WEIGHT: 200 LBS | RESPIRATION RATE: 16 BRPM | HEIGHT: 68 IN | SYSTOLIC BLOOD PRESSURE: 90 MMHG | BODY MASS INDEX: 30.31 KG/M2

## 2017-08-08 DIAGNOSIS — E66.9 OBESITY (BMI 30-39.9): ICD-10-CM

## 2017-08-08 DIAGNOSIS — Z13.21 ENCOUNTER FOR VITAMIN DEFICIENCY SCREENING: ICD-10-CM

## 2017-08-08 DIAGNOSIS — Z74.8 ASSISTANCE WITH TRANSPORTATION: ICD-10-CM

## 2017-08-08 DIAGNOSIS — E11.9 DIABETES MELLITUS WITHOUT COMPLICATION (HCC): ICD-10-CM

## 2017-08-08 DIAGNOSIS — C10.9 OROPHARYNGEAL CANCER (HCC): ICD-10-CM

## 2017-08-08 DIAGNOSIS — K59.09 OTHER CONSTIPATION: ICD-10-CM

## 2017-08-08 DIAGNOSIS — Z13.29 SCREENING FOR THYROID DISORDER: ICD-10-CM

## 2017-08-08 DIAGNOSIS — R74.8 ELEVATED ALKALINE PHOSPHATASE LEVEL: ICD-10-CM

## 2017-08-08 DIAGNOSIS — M51.36 DDD (DEGENERATIVE DISC DISEASE), LUMBAR: ICD-10-CM

## 2017-08-08 PROBLEM — K59.00 CONSTIPATION: Status: ACTIVE | Noted: 2017-08-08

## 2017-08-08 PROCEDURE — 99213 OFFICE O/P EST LOW 20 MIN: CPT | Performed by: NURSE PRACTITIONER

## 2017-08-08 PROCEDURE — 99214 OFFICE O/P EST MOD 30 MIN: CPT | Performed by: NURSE PRACTITIONER

## 2017-08-08 RX ORDER — OXYCODONE HYDROCHLORIDE 15 MG/1
TABLET ORAL
COMMUNITY
Start: 2017-08-01

## 2017-08-08 RX ORDER — POLYETHYLENE GLYCOL 3350 17 G/17G
17 POWDER, FOR SOLUTION ORAL DAILY
Qty: 1 BOTTLE | Refills: 3 | Status: SHIPPED | OUTPATIENT
Start: 2017-08-08 | End: 2018-12-11

## 2017-08-08 RX ORDER — DOCUSATE SODIUM 100 MG/1
100 CAPSULE, LIQUID FILLED ORAL 2 TIMES DAILY
Qty: 60 CAP | Refills: 2 | Status: SHIPPED | OUTPATIENT
Start: 2017-08-08 | End: 2018-12-11

## 2017-08-08 RX ORDER — LISINOPRIL 20 MG/1
20 TABLET ORAL DAILY
Qty: 30 TAB | Refills: 3 | Status: SHIPPED | OUTPATIENT
Start: 2017-08-08 | End: 2018-11-27 | Stop reason: SDUPTHER

## 2017-08-08 ASSESSMENT — PAIN SCALES - GENERAL: PAINLEVEL: NO PAIN

## 2017-08-08 NOTE — PROGRESS NOTES
"    Chief Complaint: RTC paperwork so he can get rides on Access Van.    HPI:  Neptali presents to the clinic for  He was last seen here on 10/6/16.    His PMH includes:    [place]-Pharyngeal Cancer  Chronic LBP  C-Diff  Mild COPD   Anemia  Cirrhosis  GERD  DM-2- diet controlled  Frequent Urination  Sleep Apnea  Anxiety  Hx of CVA  Paroxysmal A-fib    Established with    Pain Management- Dr Briones  Oncology-Dr Cook, DR Naqvi  ENT- Dr Gandhi  Psychiatry- Brenda Clark  Previously seen by Urology-DR Bautista-José Miguel    Review of Records show  7/10/17 Tele Encounter by Oncology office, attempt to notify Pt needs to transition to either Dr Schmitt or DR England  3/14/17 Appt w DR Naqvi ( oncology)  10/6/17 Clinic Visit for Rx Refills and RX for Nutritional Supplement shakes/Referral to Nutrition Services.    [place]  Report shows:  8/1/17 Oxycodone 15 mg # 120 by DR Briones (Pain Management)  7/16/17 Oxycodone 15 mg # 64 by DR Briones  Similar entries but at higher #'s in report, apparently slight reduction of narcs over past few months.    DM type 2 (diabetes mellitus, type 2)  Pt reports he checked his fasting BS was 120 in am.  Has diet controlled DM.  Denies any lows.    Discussed importance of some f/u Lab work and I will order today and Pt to complete  Within 6 weeks and return for appt.    Assistance with transportation  Pt asking for paperwork be completed for RTC to assist w his transportation needs.  Pt has poor mobility  Due to chronic back pain, h/o CVA.  Pt uses Wheeled Walker w seat typically and on good days and short distance sometimes  Only uses cane.     Hx of Oropharyngeal cancer  Pt is here with his wife and  Is using Wheeled Walker with seat.  Pt is going to transition from Dr Cook to Dr England or DR DON at the Oncology office.    DDD (degenerative disc disease), lumbar  Pt is established with Dr Briones and is getting Oxycodone  He has 9/3/17 for nerve block and \"procedure\".  Pt denies loss of bowel " "or bladder control but states his bowels are irregular schedule.    Constipation  Pt has about  Once a week BM  Is using Magnesium Citrate liquid rarely due to taste.  Cherry Flavored MOM helps intermittently and \"it works\".  Did try some \"samples\" from Dr Briones ( Pain management)  Of meds for use w narcotic use induced constipation and reports  They did not work.    Discussed importance of staying hydrated, increasing fiber in diet.  Recommended trial of Colace and Miralax and pt and wife agree.      Patient Active Problem List    Diagnosis Date Noted   • Hx of Oropharyngeal cancer 02/01/2015     Priority: High   • Chronic diastolic CHF (congestive heart failure) (CMS-HCC) 02/01/2015     Priority: Medium   • DM type 2 (diabetes mellitus, type 2) (CMS-HCC) 02/01/2015     Priority: Medium   • Chronic low back pain 01/10/2012     Priority: Medium   • Hyperlipidemia 11/29/2011     Priority: Medium   • Esophageal reflux 10/08/2014     Priority: Low   • PAF (paroxysmal atrial fibrillation) (CMS-HCC) 10/30/2013     Priority: Low   • H/O: CVA (cerebrovascular accident) 10/01/2013     Priority: Low   • COPD (chronic obstructive pulmonary disease) MILD 07/05/2011     Priority: Low   • Asymmetric septal hypertrophy (CMS-HCC) 12/28/2010     Priority: Low   • Methamphetamine use-QUIT in 10/10 12/27/2010     Priority: Low   • HTN (hypertension) 12/27/2010     Priority: Low   • Assistance with transportation 08/08/2017   • Constipation 08/08/2017   • Obesity (BMI 30-39.9) 08/08/2017   • BMI 32.0-32.9,adult 10/06/2016   • Elevated LFTs 07/06/2016   • Excessive flatus 04/19/2016   • Oxygen dependent 02/17/2016   • Lumbosacral spondylosis 10/15/2015   • DDD (degenerative disc disease), lumbar 10/15/2015   • Muscle spasm of back 10/15/2015   • Frequent urination 09/14/2015   • Hx of sleep apnea 08/19/2015   • Malignant neoplasm of base of tongue (CMS-HCC) 08/12/2015   • Malignant neoplasm of pharynx (CMS-HCC) 08/12/2015   • Anxiety " "07/29/2015   • Hx of Tracheostomy status 05/13/2015   • Anemia 02/19/2015   • Hx of Abnormal chest CT 01/10/2012   • Left ventricular hypertrophy 01/10/2012   • Vitamin D deficiency 07/05/2011   • Renal cyst 07/05/2011       Allergies:Review of patient's allergies indicates no known allergies.    Current Outpatient Prescriptions   Medication Sig Dispense Refill   • docusate sodium (COLACE) 100 MG Cap Take 1 Cap by mouth 2 times a day. 60 Cap 2   • polyethylene glycol 3350 (MIRALAX) Powder Take 17 g by mouth every day. 1 Bottle 3   • lisinopril (PRINIVIL) 20 MG Tab Take 1 Tab by mouth every day. 30 Tab 3   • oxycodone (OXY-IR) 15 MG immediate release tablet      • doxazosin (CARDURA) 1 MG Tab TAKE ONE TABLET BY MOUTH ONCE DAILY 30 Tab 5   • famotidine (PEPCID) 20 MG Tab TAKE ONE TABLET BY MOUTH TWICE DAILY 60 Tab 5   • amlodipine (NORVASC) 10 MG Tab TAKE ONE TABLET BY MOUTH ONCE DAILY VIA G TUBE 30 Tab 5   • oxycodone immediate release (ROXICODONE) 10 MG immediate release tablet      • Probiotic Cap Take 1 Capsule by mouth 2 times a day as needed. 60 Cap 3     No current facility-administered medications for this visit.       Social History   Substance Use Topics   • Smoking status: Never Smoker    • Smokeless tobacco: Never Used   • Alcohol Use: No       Family History   Problem Relation Age of Onset   • Stroke Mother    • Lung Disease Neg Hx    • Cancer Neg Hx    • Diabetes Neg Hx    • Heart Disease Neg Hx        ROS:  Review of Systems   See HPI Above      Exam:  Blood pressure 90/66, pulse 64, temperature 36.6 °C (97.9 °F), resp. rate 16, height 1.727 m (5' 7.99\"), weight 90.719 kg (200 lb), SpO2 94 %.  General:  Well nourished, slightly over-weight, well developed male in NAD  HENT:Head is grossly normal. PERRL.  Neck: Supple. Trachea is midline.  Pulmonary: Clear to ausculation .  Normal effort. No rales, ronchi, or wheezing.   Cardiovascular: Regular rate and rhythm.  Abdomen-Abdomen is soft, No " tenderness.  Upper extremities- Strong = . Good ROM  Lower extremities- neg for edema, redness, tenderness.  Neuro- A & O x 4. Speech clear and appropriate.     Current medications, allergies, and problem list reviewed with patient and updated in  [facility] today.    Assessment/Plan:  1. Diabetes mellitus without complication (CMS-HCC)  COMP METABOLIC PANEL    HEMOGLOBIN A1C    LIPID PROFILE    MICROALBUMIN CREAT RATIO URINE (LAB COLLECT)    REFERRAL TO OPHTHALMOLOGY for Retina Screen    CBC WITH DIFFERENTIAL    lisinopril (PRINIVIL) 20 MG Tab Refill,   Continue Amlodipine.   2. Screening for thyroid disorder  TSH   3. Encounter for vitamin deficiency screening  VITAMIN D,25 HYDROXY    VITAMIN B12   4. Assistance with transportation  Pt and wife interviewed and RTC paperwork completed and scanned into media.   5. Hx of Oropharyngeal cancer  F/u Oncology as discussed.   6. Elevated alkaline phosphatase level  ALKALINE PHOSPHATASE ISOENZYMES   7. DDD (degenerative disc disease), lumbar  Continue w Pain Management-Dr Briones and pain meds per him   8. Other constipation  docusate sodium (COLACE) 100 MG Cap    polyethylene glycol 3350 (MIRALAX) Powder  To stay hydrated.    9. Obesity (BMI 30-39.9)  Patient identified as having weight management issue.  Appropriate orders and counseling given.   Follow up in 6-8 weeks for review of labs. Call or return if questions, concerns, or worsening condition.

## 2017-08-08 NOTE — ASSESSMENT & PLAN NOTE
"Pt is established with Dr Briones and is getting Oxycodone  He has 9/3/17 for nerve block and \"procedure\".  Pt denies loss of bowel or bladder control but states his bowels are irregular schedule.  "

## 2017-08-08 NOTE — MR AVS SNAPSHOT
"        Neptali Peters   2017 12:50 PM   Office Visit   MRN: [id number]    [facility]   Dept Phone:  [phone]    Description:  Male : 1960   Provider:  STEFANI Argueta           Reason for Visit     Follow-Up RTC paperwork       Allergies as of 2017     No Known Allergies      You were diagnosed with     Diabetes mellitus without complication (CMS-[facility])   [447008]       Screening for thyroid disorder   [V77.0.ICD-9-CM]       Encounter for vitamin deficiency screening   [030603]       Assistance with transportation   [1350248]       Oropharyngeal cancer (CMS-HCC)   [660169]       Elevated alkaline phosphatase level   [872186]       DDD (degenerative disc disease), lumbar   [628478]       Other constipation   [564.09.ICD-9-CM]       Obesity (BMI 30-39.9)   [837780]         Vital Signs     Blood Pressure Pulse Temperature Respirations Height Weight    90/66 mmHg 64 36.6 °C (97.9 °F) 16 1.727 m (5' 7.99\") 90.719 kg (200 lb)    Body Mass Index Oxygen Saturation Smoking Status             30.42 kg/m2 94% Never Smoker          Basic Information     Date Of Birth Sex Race Ethnicity Preferred Language    1960 Male White Non- English      Problem List              ICD-10-CM Priority Class Noted - Resolved    Methamphetamine use-QUIT in 10/10 F15.10 Low  2010 - Present    HTN (hypertension) I10 Low  2010 - Present    Asymmetric septal hypertrophy (CMS-[facility]) I42.2 Low  2010 - Present    Vitamin D deficiency E55.9   2011 - Present    COPD (chronic obstructive pulmonary disease) MILD J44.9 Low  2011 - Present    Renal cyst N28.1   2011 - Present    Hyperlipidemia E78.5 Medium  2011 - Present    Chronic low back pain M54.5, G89.29 Medium  1/10/2012 - Present    Hx of Abnormal chest CT R93.8   1/10/2012 - Present    Left ventricular hypertrophy I51.7   1/10/2012 - Present    H/O: CVA (cerebrovascular accident) Z86.73 Low  10/1/2013 - " Present    PAF (paroxysmal atrial fibrillation) (CMS-HCC) I48.0 Low  10/30/2013 - Present    Esophageal reflux K21.9 Low  10/8/2014 - Present    Chronic diastolic CHF (congestive heart failure) (CMS-HCC) I50.32 Medium  2/1/2015 - Present    DM type 2 (diabetes mellitus, type 2) (CMS-HCC) E11.9 Medium  2/1/2015 - Present    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 High  2/1/2015 - Present    Anemia D64.9   2/19/2015 - Present    Hx of Tracheostomy status Z93.0   5/13/2015 - Present    Anxiety F41.9   7/29/2015 - Present    Malignant neoplasm of base of tongue (CMS-HCC) C01   8/12/2015 - Present    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   8/12/2015 - Present    Hx of sleep apnea Z86.69   8/19/2015 - Present    Frequent urination R35.0   9/14/2015 - Present    Lumbosacral spondylosis M47.817   10/15/2015 - Present    DDD (degenerative disc disease), lumbar M51.36   10/15/2015 - Present    Muscle spasm of back M62.830   10/15/2015 - Present    Oxygen dependent Z99.81   2/17/2016 - Present    Excessive flatus R14.3   4/19/2016 - Present    Elevated LFTs R94.5   7/6/2016 - Present    BMI 32.0-32.9,adult Z68.32   10/6/2016 - Present    Assistance with transportation Z74.8   8/8/2017 - Present    Constipation K59.00   8/8/2017 - Present    Obesity (BMI 30-39.9) E66.9   8/8/2017 - Present      Health Maintenance        Date Due Completion Dates    IMM HEP B VACCINE (1 of 3 - Primary Series) 1960 ---    IMM DTaP/Tdap/Td Vaccine (1 - Tdap) 6/30/1980 ---    RETINAL SCREENING 10/18/2013 10/18/2012 (Declined)    Override on 10/18/2012: Patient Declined (pt refused due to financial constraints)    DIABETES MONOFILAMENT / LE EXAM 10/18/2013 10/18/2012 (Done)    Override on 10/18/2012: Done    URINE ACR / MICROALBUMIN 8/13/2014 8/13/2013, 7/2/2012, 10/18/2011    A1C SCREENING 10/14/2015 4/14/2015, 11/1/2014, 11/12/2013, 8/13/2013, 2/27/2013, 10/16/2012, 6/7/2012, 1/4/2012, 8/1/2011, 12/27/2010    FASTING LIPID PROFILE 12/11/2016  12/11/2015, 8/13/2013, 2/27/2013, 10/16/2012, 6/7/2012, 1/4/2012, 6/17/2011, 12/27/2010    IMM INFLUENZA (1) 9/1/2017 10/6/2016, 10/14/2015, 10/10/2014, 10/31/2013    SERUM CREATININE 12/9/2017 12/9/2016, 9/9/2016, 8/4/2016, 7/8/2016, 6/10/2016, 5/13/2016, 4/8/2016, 3/11/2016, 2/12/2016, 1/8/2016, 12/11/2015, 11/13/2015, 10/9/2015, 9/11/2015, 8/14/2015, 7/17/2015, 6/19/2015, 5/22/2015, 4/24/2015, 3/27/2015, 3/17/2015, 3/9/2015, 3/7/2015, 3/3/2015, 3/2/2015, 2/25/2015, 2/22/2015, 2/11/2015, 2/3/2015, 2/1/2015, 1/31/2015, 1/30/2015, 1/22/2015, 1/22/2015, 1/16/2015, 1/16/2015, 1/9/2015, 12/31/2014, 12/22/2014, 12/21/2014, 12/20/2014, 12/19/2014, 12/18/2014, 12/17/2014, 12/16/2014, 12/15/2014, 12/14/2014, 12/13/2014, 12/12/2014, 12/11/2014, 12/10/2014, 12/9/2014, 12/8/2014, 12/7/2014, 12/5/2014, 12/5/2014, 12/1/2014, 11/29/2014, 11/28/2014, 11/26/2014, 11/24/2014, 11/22/2014, 11/21/2014, 11/17/2014, 11/13/2014, 11/10/2014, 11/9/2014, 11/4/2014, 11/3/2014, 11/2/2014, 11/1/2014, 10/31/2014, 10/30/2014, 10/29/2014, 10/28/2014, 10/27/2014, 10/26/2014, 10/25/2014, 10/24/2014, 10/23/2014, 10/22/2014, 10/21/2014, 10/20/2014, 10/19/2014, 10/18/2014, 10/17/2014, 10/16/2014, 10/15/2014, 10/14/2014, 10/13/2014, 10/12/2014, 10/11/2014, 10/10/2014, 10/9/2014, 10/8/2014, 10/2/2014, 4/23/2014, 12/10/2013, 12/3/2013, 11/26/2013, 11/19/2013, 11/12/2013, 11/10/2013, 11/6/2013, 11/4/2013, 11/2/2013, 10/31/2013, 10/30/2013, 8/13/2013, 7/20/2013, 2/27/2013, 11/1/2012, 10/31/2012, 10/16/2012, 7/2/2012, 6/7/2012, 1/4/2012, 11/26/2011, 10/18/2011, 8/5/2011, 8/1/2011, 6/17/2011, 4/28/2011, 4/27/2011, 12/29/2010, 12/28/2010, 12/27/2010, 12/26/2010    COLONOSCOPY 10/18/2022 10/18/2012 (Declined)    Override on 10/18/2012: Patient Declined (refused due to lack of insurance/finacial constraints)            Current Immunizations     Influenza TIV (IM) 10/31/2013  5:43 AM    Influenza Vaccine Quad Inj (Pf) 10/10/2014  2:30 AM    Influenza Vaccine  Quad Inj (Preserved) 10/6/2016, 10/14/2015    Pneumococcal polysaccharide vaccine (PPSV-23) 10/31/2013  5:51 AM      Below and/or attached are the medications your provider expects you to take. Review all of your home medications and newly ordered medications with your provider and/or pharmacist. Follow medication instructions as directed by your provider and/or pharmacist. Please keep your medication list with you and share with your provider. Update the information when medications are discontinued, doses are changed, or new medications (including over-the-counter products) are added; and carry medication information at all times in the event of emergency situations     Allergies:  No Known Allergies          Medications  Valid as of: August 08, 2017 -  1:33 PM    Generic Name Brand Name Tablet Size Instructions for use    AmLODIPine Besylate (Tab) NORVASC 10 MG TAKE ONE TABLET BY MOUTH ONCE DAILY VIA G TUBE        Docusate Sodium (Cap) COLACE 100 MG Take 1 Cap by mouth 2 times a day.        Doxazosin Mesylate (Tab) CARDURA 1 MG TAKE ONE TABLET BY MOUTH ONCE DAILY        Famotidine (Tab) PEPCID 20 MG TAKE ONE TABLET BY MOUTH TWICE DAILY        Lisinopril (Tab) PRINIVIL 20 MG Take 1 Tab by mouth every day.        OxyCODONE HCl (Tab) ROXICODONE 10 MG         OxyCODONE HCl (Tab) OXY-IR 15 MG         Polyethylene Glycol 3350 (Powder) MIRALAX  Take 17 g by mouth every day.        Probiotic Product (Cap) Probiotic  Take 1 Capsule by mouth 2 times a day as needed.        .                 Medicines prescribed today were sent to:     [facility] [address] [place] [id number]    Phone: [phone] Fax: [phone]    Open 24 Hours?: No      Medication refill instructions:       If your prescription bottle indicates you have medication refills left, it is not necessary to call your provider’s office. Please contact your pharmacy and they will refill your  medication.    If your prescription bottle indicates you do not have any refills left, you may request refills at any time through one of the following ways: The online [employer] system (except Urgent Care), by calling your provider’s office, or by asking your pharmacy to contact your provider’s office with a refill request. Medication refills are processed only during regular business hours and may not be available until the next business day. Your provider may request additional information or to have a follow-up visit with you prior to refilling your medication.   *Please Note: Medication refills are assigned a new Rx number when refilled electronically. Your pharmacy may indicate that no refills were authorized even though a new prescription for the same medication is available at the pharmacy. Please request the medicine by name with the pharmacy before contacting your provider for a refill.        Your To Do List     Future Labs/Procedures Complete By Expires    ALKALINE PHOSPHATASE ISOENZYMES  As directed 8/8/2018    CBC WITH DIFFERENTIAL  As directed 8/8/2018    COMP METABOLIC PANEL  As directed 8/8/2018    HEMOGLOBIN A1C  As directed 8/8/2018    LIPID PROFILE  As directed 8/8/2018    MICROALBUMIN CREAT RATIO URINE (LAB COLLECT)  As directed 8/8/2018    TSH  As directed 8/8/2018    VITAMIN B12  As directed 8/8/2018    VITAMIN D,25 HYDROXY  As directed 8/8/2018      Referral     A referral request has been sent to our patient care coordination department. Please allow 3-5 business days for us to process this request and contact you either by phone or mail. If you do not hear from us by the 5th business day, please call us at [phone].        Other Notes About Your Plan     8/12/16 DR Thomas appt.  6/10/16 DR Cook Apptt- Elevated LFT's consistent w Cirhosis, May need GI referral, Swallow eval, continue PEG tube feedings.f/u 3 months.  3/11/16 Nikki Schaffer (Oncology Coordinator/APRN) Appt  3/7/16  OPO Report completed -[facility]  1/8/16 Dr Wood (ENT) Follow up appt.  FALL RISK ASSESSMENT COMPLETED 7/29/15  10/15/15 Pain Management Encounter-Dr Anselmo santos Lumbosacral Spondylosis, Lumbar DDD, Chronic Back Pain, Muscle Leg Spasms.  6/19/15 Dr Driss URBANO Encounter: PET Scan and video reviewed, No visible tumor noted, Suspect PET Findings are inflammatory. Recommend f//u with Dr Wood and Dr Naqvi. Start on Nystatin for extensive oropharyngeal candidiasis ( 10 day course).  4/15/15  [facility] Modified Barium Swallow Study Report- Impression: Severe Pharyngeal/Esophageal Dysphagia. Ascending and Descending Aspiration of Thin and Thick Liquids. Poor ability to protect airway. Senses aspiration but unable to eject.  Recommendations: Diet NPO of solids or liquids, Use G-Tube. Medications thru G-tube. May benefit from referral to GI MD.  4/9/15 Oncology Med [facility] (Nikki Schaffer) Note: Observation Status r/e head and neck cancer. PET CT is due in early June w/ f/u with Dr. Naqvi 6/11/15. Swallow Eval scheduled for 4/11/15. (Dr. Wood ENT),  Speech therapist twice/wk,   4/8/15 TSH= 1.100 ( .300-3.700)           MyChart Access Code: Activation code not generated  Current [employer] Status: Active

## 2017-08-08 NOTE — ASSESSMENT & PLAN NOTE
Pt has about  Once a week BM  Is using Magnesium Citrate liquid rarely due to taste.  Cherry Flavored MOM helps intermittently.

## 2017-08-08 NOTE — ASSESSMENT & PLAN NOTE
Pt asking for paperwork be completed for RTC to assist w his transportation needs.  Pt has poor mobility  Due to chronic back pain, h/o CVA

## 2017-08-08 NOTE — ASSESSMENT & PLAN NOTE
Pt is here with his wife and  Is using Wheeled Walker with seat.  Pt is going to transition from Dr Cook to Dr England or DR DON at the Oncology office.

## 2017-08-11 ENCOUNTER — APPOINTMENT (OUTPATIENT)
Dept: HEMATOLOGY ONCOLOGY | Facility: MEDICAL CENTER | Age: 57
End: 2017-08-11
Payer: MEDICARE

## 2017-08-29 ENCOUNTER — TELEPHONE (OUTPATIENT)
Dept: HEMATOLOGY ONCOLOGY | Facility: MEDICAL CENTER | Age: 57
End: 2017-08-29

## 2017-08-29 NOTE — TELEPHONE ENCOUNTER
2nd attempt     I called left message for patient to return my call regarding his transfer care from Dr Cook to Dr. Fowler or Dr. England. [facility]

## 2017-11-28 ENCOUNTER — TELEPHONE (OUTPATIENT)
Dept: HEMATOLOGY ONCOLOGY | Facility: MEDICAL CENTER | Age: 57
End: 2017-11-28

## 2017-11-28 NOTE — TELEPHONE ENCOUNTER
2nd attempt     I called left message for patient to return my call regarding his transfer care from Dr Cook to Dr. Fowler or Dr. England. Since Dr Cook is not longer with [facility]

## 2017-12-08 ENCOUNTER — TELEPHONE (OUTPATIENT)
Dept: HEMATOLOGY ONCOLOGY | Facility: MEDICAL CENTER | Age: 57
End: 2017-12-08

## 2017-12-08 NOTE — TELEPHONE ENCOUNTER
Patients wife called and said their are financial issues with coming to our clinic. Wife stated that it is up in the air with edward at this point and they will call back with final decision on who he will be establishing care with.

## 2018-01-23 ENCOUNTER — TELEPHONE (OUTPATIENT)
Dept: HEMATOLOGY ONCOLOGY | Facility: MEDICAL CENTER | Age: 58
End: 2018-01-23

## 2018-01-24 NOTE — TELEPHONE ENCOUNTER
3rd attempt     I called left message for patient to return my call regarding his transfer care from Dr Cook to Dr. Fowler or Dr. England. Since Dr Cook is not longer with [facility]

## 2018-01-31 RX ORDER — DOXAZOSIN MESYLATE 1 MG/1
TABLET ORAL
Qty: 30 TAB | Refills: 5 | Status: SHIPPED | OUTPATIENT
Start: 2018-01-31 | End: 2018-07-31 | Stop reason: SDUPTHER

## 2018-03-09 ENCOUNTER — HOSPITAL ENCOUNTER (OUTPATIENT)
Dept: RADIATION ONCOLOGY | Facility: MEDICAL CENTER | Age: 58
End: 2018-03-31
Attending: RADIOLOGY
Payer: MEDICARE

## 2018-03-09 VITALS
DIASTOLIC BLOOD PRESSURE: 55 MMHG | BODY MASS INDEX: 31.94 KG/M2 | WEIGHT: 210 LBS | SYSTOLIC BLOOD PRESSURE: 152 MMHG | HEART RATE: 48 BPM | TEMPERATURE: 98.3 F | OXYGEN SATURATION: 94 %

## 2018-03-09 PROCEDURE — 31575 DIAGNOSTIC LARYNGOSCOPY: CPT | Performed by: RADIOLOGY

## 2018-03-09 PROCEDURE — 99214 OFFICE O/P EST MOD 30 MIN: CPT | Mod: 25 | Performed by: RADIOLOGY

## 2018-03-09 PROCEDURE — 99212 OFFICE O/P EST SF 10 MIN: CPT | Mod: 25 | Performed by: RADIOLOGY

## 2018-03-09 ASSESSMENT — PAIN SCALES - GENERAL: PAINLEVEL: NO PAIN

## 2018-03-09 NOTE — PROGRESS NOTES
RADIATION ONCOLOGY FOLLOW-UP    DATE OF SERVICE:   3/9/2018    IDENTIFICATION:   A 57 y.o. 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 He presented with airway compromise requiring emergent trach and ventilator support. He received 1 cycle of induction chemotherapy to allow weaning off ventilator followed by concurrent chemoradiotherapy cisplatin-based 70 grade 35 fractions 45 elapsed days completed January 30, 2015.    HISTORY OF PRESENT ILLNESS:   Routine follow-up visit. Overall feels well. Has some mild discomfort in his pharynx. Still taking in all foods by mouth. Has problems with dry foods secondary to xerostomia. Weights up 8 pounds.      CURRENT MEDICATIONS:  Current Outpatient Prescriptions   Medication Sig Dispense Refill   • doxazosin (CARDURA) 1 MG Tab TAKE ONE TABLET BY MOUTH ONCE DAILY 30 Tab 5   • oxycodone (OXY-IR) 15 MG immediate release tablet      • lisinopril (PRINIVIL) 20 MG Tab Take 1 Tab by mouth every day. 30 Tab 3   • famotidine (PEPCID) 20 MG Tab TAKE ONE TABLET BY MOUTH TWICE DAILY 60 Tab 5   • docusate sodium (COLACE) 100 MG Cap Take 1 Cap by mouth 2 times a day. 60 Cap 2   • polyethylene glycol 3350 (MIRALAX) Powder Take 17 g by mouth every day. 1 Bottle 3   • amlodipine (NORVASC) 10 MG Tab TAKE ONE TABLET BY MOUTH ONCE DAILY VIA G TUBE 30 Tab 5   • oxycodone immediate release (ROXICODONE) 10 MG immediate release tablet      • Probiotic Cap Take 1 Capsule by mouth 2 times a day as needed. 60 Cap 3     No current facility-administered medications for this encounter.        ALLERGIES:  Patient has no known allergies.    REVIEW OF SYSTEMS:  A review of systems for today's date of service was reviewed and uploaded into the electronic medical record.    PHYSICAL EXAM:   /55   Pulse (!) 48   Temp 36.8 °C (98.3 °F)   Wt 95.3 kg (210 lb)   SpO2 94%   BMI 31.94 kg/m²   GENERAL: Alert and oriented no acute distress  HEENT:  Pupils  are equal, round, and reactive to light.  Extraocular muscles   are intact. Sclerae nonicteric.  Conjunctivae pink.  Oral cavity, tongue   protrudes midline. Edentulous. Mucous membranes are dry.  NECK: Woody induration bilaterally.  NODES:  No peripheral adenopathy of the neck, supraclavicular fossa or axillae   bilaterally.  LUNGS:  Clear to ascultation and resonant to percussion.  HEART:  Regular rate and rhythm.  No murmur appreciated  ABDOMEN:  Soft. No evidence of hepatosplenomegaly.  Positive bowel sounds.  EXTREMITIES:  Without Edema.  NEUROLOGIC:  Cranial nerves II through XII were intact.  Strength is 5/5 in   lower extremities bilaterally.  DTRs were symmetrical.  There was no focal   sensory deficit appreciated.    FIBEROPTIC EXAM:  See separate note. Normal exam.    LABORATORY DATA:   Lab Results   Component Value Date/Time    SODIUM 143 12/09/2016 10:06 AM    POTASSIUM 3.9 12/09/2016 10:06 AM    CHLORIDE 105 12/09/2016 10:06 AM    CO2 33 12/09/2016 10:06 AM    GLUCOSE 89 12/09/2016 10:06 AM    BUN 27 (H) 12/09/2016 10:06 AM    CREATININE 1.04 12/09/2016 10:06 AM     Lab Results   Component Value Date/Time    ALKPHOSPHAT 173 (H) 12/09/2016 10:06 AM    ASTSGOT 59 (H) 12/09/2016 10:06 AM    ALTSGPT 41 12/09/2016 10:06 AM    TBILIRUBIN 0.6 12/09/2016 10:06 AM      Lab Results   Component Value Date/Time    WBC 8.1 12/09/2016 10:06 AM    RBC 4.39 (L) 12/09/2016 10:06 AM    HEMOGLOBIN 13.6 (L) 12/09/2016 10:06 AM    HEMATOCRIT 41.2 (L) 12/09/2016 10:06 AM    MCV 93.8 12/09/2016 10:06 AM    MCH 31.0 12/09/2016 10:06 AM    MCHC 33.0 (L) 12/09/2016 10:06 AM    MPV 11.1 12/09/2016 10:06 AM    NEUTSPOLYS 70.20 12/09/2016 10:06 AM    LYMPHOCYTES 18.20 (L) 12/09/2016 10:06 AM    MONOCYTES 8.00 12/09/2016 10:06 AM    EOSINOPHILS 3.20 12/09/2016 10:06 AM    BASOPHILS 0.20 12/09/2016 10:06 AM    HYPOCHROMIA 1+ 12/14/2014 01:00 AM    ANISOCYTOSIS 1+ 03/02/2015 02:55 AM        RADIOLOGY DATA:  No results  found.    IMPRESSION:    A 57 y.o. with base of tongue cancer clinically without evidence of disease.    RECOMMENDATIONS:   Reviewed video laryngoscopic exam with the patient and reassured him. He'll return for follow-up in one year.    25 minutes was spent face-to-face with patient in the office and more than half of that time was spent counseling patient or coordinating care as described above.    Thank you for the opportunity to participate in his care.  If any questions or comments, please do not hesitate in calling.    Sangeeta MARTINEZ M.D.  Electronically signed by: Sangeeta Naqvi V, 3/9/2018 10:53 AM  [phone]

## 2018-03-09 NOTE — NON-PROVIDER
Patient was seen today in clinic with Dr. Naqvi for follow up.  Vitals signs and weight were obtained and pain assessment was completed.  Allergies and medications were reviewed with the patient.  Review of systems completed.     Vitals/Pain:  Vitals:    03/09/18 1001   BP: 152/55   Pulse: (!) 48   Temp: 36.8 °C (98.3 °F)   SpO2: 94%   Weight: 95.3 kg (210 lb)   Pain Score: No pain        Allergies:   Patient has no known allergies.    Current Medications:  Current Outpatient Prescriptions   Medication Sig Dispense Refill   • doxazosin (CARDURA) 1 MG Tab TAKE ONE TABLET BY MOUTH ONCE DAILY 30 Tab 5   • oxycodone (OXY-IR) 15 MG immediate release tablet      • lisinopril (PRINIVIL) 20 MG Tab Take 1 Tab by mouth every day. 30 Tab 3   • famotidine (PEPCID) 20 MG Tab TAKE ONE TABLET BY MOUTH TWICE DAILY 60 Tab 5   • docusate sodium (COLACE) 100 MG Cap Take 1 Cap by mouth 2 times a day. 60 Cap 2   • polyethylene glycol 3350 (MIRALAX) Powder Take 17 g by mouth every day. 1 Bottle 3   • amlodipine (NORVASC) 10 MG Tab TAKE ONE TABLET BY MOUTH ONCE DAILY VIA G TUBE 30 Tab 5   • oxycodone immediate release (ROXICODONE) 10 MG immediate release tablet      • Probiotic Cap Take 1 Capsule by mouth 2 times a day as needed. 60 Cap 3     No current facility-administered medications for this encounter.          PCP:  Chay Parisi, Med Ass't

## 2018-03-09 NOTE — PROGRESS NOTES
Fiberoptic Naso-pharyngoscopy Note      Flexible fiberoptic exam after anesthesia of left nostril and oropharynx  demostrated normal nasopharyngeal structures including the   opening of the eustachian canal, torus and fossa of Rosenmuller bilaterally.    The oropharynx, the base of tongue, vallecula, epiglottis, PE folds appear   normal.  Laryngeal structures, the AE folds, false vocal folds, arytenoids   appeared normal.  Cords meet at midline on the phonation of vowel E.  Piriform  sinuses showed no masses.    Sangeeta MARTINEZ M.D.  Electronically signed by: Sangeeta Naqvi V, 3/9/2018 10:55 AM  [phone]

## 2018-03-09 NOTE — ADDENDUM NOTE
Encounter addended by: Sangeeta MARTINEZ M.D. on: 3/9/2018 10:55 AM<BR>    Actions taken: Sign clinical note

## 2018-06-28 RX ORDER — LISINOPRIL 20 MG/1
TABLET ORAL
Qty: 30 TAB | Refills: 5 | Status: SHIPPED | OUTPATIENT
Start: 2018-06-28 | End: 2018-11-27

## 2018-06-28 RX ORDER — AMLODIPINE BESYLATE 10 MG/1
TABLET ORAL
Qty: 30 TAB | Refills: 5 | Status: SHIPPED | OUTPATIENT
Start: 2018-06-28 | End: 2018-11-19 | Stop reason: SDUPTHER

## 2018-06-28 NOTE — TELEPHONE ENCOUNTER
Was the patient seen in the last year in this department? Yes     Does patient have an active prescription for medications requested? No     Received Request Via: Pharmacy   Future Appointments       Provider Department [place]    3/15/2019 10:00 AM (Arrive by 9:45 AM) Sangeeta MARTINEZ M.D. [facility] Radiation Therapy

## 2018-08-01 RX ORDER — DOXAZOSIN MESYLATE 1 MG/1
TABLET ORAL
Qty: 30 TAB | Refills: 5 | Status: SHIPPED | OUTPATIENT
Start: 2018-08-01 | End: 2018-11-27 | Stop reason: SDUPTHER

## 2018-11-19 RX ORDER — AMLODIPINE BESYLATE 10 MG/1
TABLET ORAL
Qty: 90 TAB | Refills: 0 | Status: SHIPPED | OUTPATIENT
Start: 2018-11-19 | End: 2019-01-08 | Stop reason: SDUPTHER

## 2018-11-27 ENCOUNTER — OFFICE VISIT (OUTPATIENT)
Dept: MEDICAL GROUP | Facility: PHYSICIAN GROUP | Age: 58
End: 2018-11-27
Payer: MEDICARE

## 2018-11-27 VITALS
WEIGHT: 225.3 LBS | SYSTOLIC BLOOD PRESSURE: 124 MMHG | OXYGEN SATURATION: 96 % | BODY MASS INDEX: 34.14 KG/M2 | DIASTOLIC BLOOD PRESSURE: 70 MMHG | HEIGHT: 68 IN | HEART RATE: 64 BPM | TEMPERATURE: 98.8 F

## 2018-11-27 DIAGNOSIS — C10.9 OROPHARYNGEAL CANCER (HCC): ICD-10-CM

## 2018-11-27 DIAGNOSIS — I51.7 LEFT VENTRICULAR HYPERTROPHY: ICD-10-CM

## 2018-11-27 DIAGNOSIS — M51.36 DDD (DEGENERATIVE DISC DISEASE), LUMBAR: ICD-10-CM

## 2018-11-27 DIAGNOSIS — I42.2 ASYMMETRIC SEPTAL HYPERTROPHY (HCC): ICD-10-CM

## 2018-11-27 DIAGNOSIS — Z00.00 PREVENTATIVE HEALTH CARE: ICD-10-CM

## 2018-11-27 DIAGNOSIS — E66.9 OBESITY (BMI 30-39.9): ICD-10-CM

## 2018-11-27 DIAGNOSIS — K21.9 GASTROESOPHAGEAL REFLUX DISEASE WITHOUT ESOPHAGITIS: ICD-10-CM

## 2018-11-27 DIAGNOSIS — E78.5 HYPERLIPIDEMIA, UNSPECIFIED HYPERLIPIDEMIA TYPE: ICD-10-CM

## 2018-11-27 DIAGNOSIS — I48.0 PAF (PAROXYSMAL ATRIAL FIBRILLATION) (HCC): ICD-10-CM

## 2018-11-27 DIAGNOSIS — I10 ESSENTIAL HYPERTENSION: ICD-10-CM

## 2018-11-27 DIAGNOSIS — I49.9 IRREGULAR HEART RHYTHM: ICD-10-CM

## 2018-11-27 DIAGNOSIS — I50.32 CHRONIC DIASTOLIC CHF (CONGESTIVE HEART FAILURE) (HCC): ICD-10-CM

## 2018-11-27 DIAGNOSIS — K74.60 HEPATIC CIRRHOSIS, UNSPECIFIED HEPATIC CIRRHOSIS TYPE (HCC): ICD-10-CM

## 2018-11-27 DIAGNOSIS — E11.9 DIABETES MELLITUS WITHOUT COMPLICATION (HCC): ICD-10-CM

## 2018-11-27 DIAGNOSIS — E11.9 TYPE 2 DIABETES MELLITUS WITHOUT COMPLICATION, WITHOUT LONG-TERM CURRENT USE OF INSULIN (HCC): ICD-10-CM

## 2018-11-27 PROBLEM — K59.00 CONSTIPATION: Status: RESOLVED | Noted: 2017-08-08 | Resolved: 2018-11-27

## 2018-11-27 PROCEDURE — 99214 OFFICE O/P EST MOD 30 MIN: CPT | Performed by: FAMILY MEDICINE

## 2018-11-27 RX ORDER — FAMOTIDINE 20 MG/1
TABLET, FILM COATED ORAL
Qty: 60 TAB | Refills: 5 | Status: SHIPPED | OUTPATIENT
Start: 2018-11-27 | End: 2020-08-21

## 2018-11-27 RX ORDER — LISINOPRIL 20 MG/1
20 TABLET ORAL DAILY
Qty: 30 TAB | Refills: 3 | Status: SHIPPED | OUTPATIENT
Start: 2018-11-27 | End: 2019-03-04 | Stop reason: SDUPTHER

## 2018-11-27 RX ORDER — DOXAZOSIN MESYLATE 1 MG/1
TABLET ORAL
Qty: 90 TAB | Refills: 3 | Status: SHIPPED | OUTPATIENT
Start: 2018-11-27 | End: 2019-02-20 | Stop reason: SDUPTHER

## 2018-11-27 ASSESSMENT — PATIENT HEALTH QUESTIONNAIRE - PHQ9: CLINICAL INTERPRETATION OF PHQ2 SCORE: 0

## 2018-11-27 NOTE — PROGRESS NOTES
"CC: Diabetes, hypertension    HISTORY OF THE PRESENT ILLNESS: Patient is a 58 y.o. male. This pleasant patient is here today to establish care and discuss health problems as below.    Type 2 diabetes: Patient has not had labs in a very long time.  He reports that he has had a diagnosis of diabetes in the past but is \"diet-controlled\".  He takes no medications for his diabetes at this time.    GERD: This is a chronic problem for the patient.  He takes Pepcid 20 mg twice daily.  This does control his symptoms and he is requesting a refill on this medication today.    Degenerative disc disease: Patient has a history of back arthritis as well as degenerative disc disease and chronic back pain.  He follows with [facility] for pain management.  He takes oxycodone IR as needed for his pain.    Heart issues: Noted in patient's chart is a history of paroxysmal atrial fibrillation, chronic diastolic CHF, left ventricular hypertrophy.  Patient states he knows of a enlarged heart but is unaware of these other diagnoses.  He states he has never had an irregular heart rhythm.  He states that he does not have a cardiologist right now and the last time he followed with one was when he was hospitalized for his throat cancer.  He is unaware of a diagnosis of atrial fibrillation as well.  He denies chest pain, palpitations, orthopnea, lower extremity edema, PND.  He does admit to shortness of breath but he also has underlying COPD.    COPD: This is a chronic problem for the patient.  He has never been a smoker but does admit to smoking crack and meth for an extended period of time in the past.  He has not done this for many years.  He apparently used to be on oxygen but was told that he no longer needs it.  He is not on any medications for his COPD.  He does not follow with pulmonology.  He currently denies any issues with cough, sputum production.  He does have some mild shortness of breath at baseline which is not worsened " recently.    High cholesterol: This is a chronic problem for the patient.  He does not take any medications for his cholesterol.  He has not had a check for many years and is unsure where he is at at this time    Hypertension: This is a chronic problem for the patient.  He takes Cardura 1 mg daily as well as lisinopril 20 mg daily and amlodipine 10 mg daily.  His blood pressure is well controlled at this time.  He denies any cardiac symptoms.    History of oral pharyngeal cancer: Patient had a prolonged hospitalization for 5 months about 5 years ago at which time he was treated for his oropharyngeal cancer.  He has been in remission for about 5 years now.  He has a follow-up appointment with his radiation oncologist in March for monitoring of his previous cancer.  He denies any recent hoarseness, lymphadenopathy.  He did have a sore throat of couple of weeks ago which resolved.    Allergies: Patient has no known allergies.    Current Outpatient Prescriptions Ordered in [facility]   Medication Sig Dispense Refill   • famotidine (PEPCID) 20 MG Tab TAKE ONE TABLET BY MOUTH TWICE DAILY 60 Tab 5   • doxazosin (CARDURA) 1 MG Tab TAKE ONE TABLET BY MOUTH ONCE DAILY 90 Tab 3   • lisinopril (PRINIVIL) 20 MG Tab Take 1 Tab by mouth every day. 30 Tab 3   • amLODIPine (NORVASC) 10 MG Tab TAKE ONE TABLET BY MOUTH ONCE DAILY VIA G TUBE 90 Tab 0   • oxycodone (OXY-IR) 15 MG immediate release tablet      • docusate sodium (COLACE) 100 MG Cap Take 1 Cap by mouth 2 times a day. 60 Cap 2   • polyethylene glycol 3350 (MIRALAX) Powder Take 17 g by mouth every day. 1 Bottle 3     No current Epic-ordered facility-administered medications on file.        Past Medical History:   Diagnosis Date   • Anxiety    • Backpain 2001    lower back pain   • Blood transfusion without reported diagnosis    • Cancer (HCC)    • CATARACT oct 24 2013    righteye cataract surgery   • CHF (congestive heart failure) (HCC)    • COPD (chronic obstructive pulmonary  disease) MILD 7/5/2011    home o2 3 liters at evening   • CVA (cerebral infarction)     10/31/2012 /right facial droop and slurredspeech/ right leg weakness   • Dental disorder     dentures   • Depression    • Diabetes 2007    possible   • GERD (gastroesophageal reflux disease)    • Hyperlipidemia    • Hypertension 2010   • Hypertr obst cardiomyop    • PAF (paroxysmal atrial fibrillation) ([facility]) 10/30/2013   • Psychiatric problem     history of meth use   • Sleep apnea        Past Surgical History:   Procedure Laterality Date   • LARYNGOSCOPY N/A 8/12/2015    Procedure: LARYNGOSCOPY ;  Surgeon: Cesar Wood M.D.;  Location: SURGERY SAME DAY [facility]S;  Service:    • ESOPHAGOSCOPY N/A 8/12/2015    Procedure: ESOPHAGOSCOPY ;  Surgeon: Cesar Wood M.D.;  Location: SURGERY SAME DAY [facility];  Service:    • CATH PLACEMENT  10/20/2014    Performed by Christina Frias M.D. at SURGERY [facility] ORS   • GASTROSTOMY LAPAROSCOPIC  10/11/2014    Performed by Christina Frias M.D. at SURGERY [facility] ORS   • TRACHEOSTOMY  10/11/2014    Performed by Christina Frias M.D. at SURGERY [facility] ORS   • LARYNGOSCOPY  10/8/2014    Performed by Cesar Wood M.D. at SURGERY SAME DAY [facility] ORS   • ESOPHAGOSCOPY  10/8/2014    Performed by Cesar Wood M.D. at SURGERY SAME DAY [facility] ORS   • BIOPSY GENERAL  10/8/2014    Performed by Cesar Wood M.D. at SURGERY SAME DAY [facility] ORS   • TRACHEOSTOMY  10/8/2014    Performed by Cesar Wood M.D. at SURGERY SAME DAY [facility]   • CUONG BY LAPAROSCOPY  2008   • CHOLECYSTECTOMY     • OTHER      right cataract       Social History   Substance Use Topics   • Smoking status: Never Smoker   • Smokeless tobacco: Never Used   • Alcohol use No       Social History     Social History Narrative   • No narrative on file       Family History   Problem Relation Age of Onset   • Stroke Mother    • Lung Disease Neg Hx    • Cancer Neg Hx    • Diabetes Neg Hx    •  "Heart Disease Neg Hx        ROS:     - Constitutional: Negative for fever, chills, unexpected weight change, and fatigue/generalized weakness.     - HEENT positive for recent sore throat which is resolved.: Negative for headaches, vision changes, hearing changes, ear pain, ear discharge, rhinorrhea, sinus congestion,  and neck pain.      - Respiratory: Positive for chronic mild shortness of breath.  Negative for cough, sputum production, chest congestion, wheezing, and crackles.      - Cardiovascular: Negative for chest pain, palpitations, orthopnea, PND, and bilateral lower extremity edema.     - Gastrointestinal: Negative for heartburn, nausea, vomiting, abdominal pain, hematochezia, melena, diarrhea, constipation, and greasy/foul-smelling stools.     - Genitourinary: Negative for dysuria, polyuria, hematuria, pyuria, urinary urgency, and urinary incontinence.     - Musculoskeletal: Positive for chronic back pain.     - Skin: Negative for rash, itching, cyanotic skin color change.     - Neurological: Positive for difficulty walking secondary to a past stroke.  Negative for dizziness, tingling, tremors, focal sensory deficit, focal weakness and headaches.     - Psychiatric/Behavioral: Positive for anxiety.     Exam: Blood pressure 124/70, pulse 64, temperature 37.1 °C (98.8 °F), temperature source Temporal, height 1.727 m (5' 8\"), weight 102.2 kg (225 lb 4.8 oz), SpO2 96 %. Body mass index is 34.26 kg/m².    General: Chronically ill-appearing, no apparent distress  HEENT: Normocephalic. Conjunctiva clear, lids without ptosis, pupils equal and reactive to light accommodation, ears normal shape and contour, oropharynx is without erythema, edema or exudates.  Edentulous.  Neck: Scar noted from previous tracheostomy.  No obvious lymphadenopathy.  Chronic changes present from previous radiation to the area.  Pulmonary: Clear to ausculation.  Normal effort. No rales, ronchi, or wheezing.  Cardiovascular: Regular rate " with intermittently irregular rhythm without murmur, rubs or gallop.  Abdomen: Soft, nontender, nondistended. Normal bowel sounds. Liver and spleen are not palpable. No rebound or guarding  Lymph: No cervical, supraclavicular  lymph nodes are palpable  Skin: Warm and dry.  No obvious lesions.  Musculoskeletal:  No extremity cyanosis, clubbing, or edema.  Ambulates with cane.   Psych: Normal mood and affect. Alert and oriented. Judgment and insight is normal.    Please note that this dictation was created using voice recognition software. I have made every reasonable attempt to correct obvious errors, but I expect that there are errors of grammar and possibly content that I did not discover before finalizing the note.      Assessment/Plan  Neptali was seen today for establish care and medication refill.    Diagnoses and all orders for this visit:    Diabetes mellitus without complication (HCC)  This is a chronic problem for the patient which is reportedly diet controlled.  As he has not had any labs in several years, I have ordered labs as below.  Lisinopril also refilled.   -     lisinopril (PRINIVIL) 20 MG Tab; Take 1 Tab by mouth every day.  -     COMP METABOLIC PANEL; Future  -     HEMOGLOBIN A1C; Future  -     Lipid Profile; Future    Gastroesophageal reflux disease without esophagitis  This is a chronic well-controlled problem for the patient.  Famotidine 20 mg twice daily refilled today.  -     famotidine (PEPCID) 20 MG Tab; TAKE ONE TABLET BY MOUTH TWICE DAILY    DDD (degenerative disc disease), lumbar  This is a chronic problem for the patient.  He follows with [facility] for pain management.    Obesity (BMI 30-39.9)  -     Patient identified as having weight management issue.  Appropriate orders and counseling given.  -     TSH WITH REFLEX TO FT4; Future    Preventative health care  -     CBC WITH DIFFERENTIAL; Future    Hyperlipidemia, unspecified hyperlipidemia type  This is a chronic problem for the  patient.  He likely needs a statin but has not had a lipid panel in some time.  Lipid panel is been ordered today.    Oropharyngeal cancer (HCC)  This is a problem which is apparently in remission for the patient.  He will follow with radiation oncology for close monitoring.  He has no recent symptoms of recurrence.    Hepatic cirrhosis, unspecified hepatic cirrhosis type (HCC)  This is a chronic problem for the patient.  He was unaware of this diagnosis today.  On review of chart, he has had elevated LFTs persistently.  I will recheck CMP today.  We discussed that it is likely he will need a right upper quadrant ultrasound to further characterize any level of cirrhosis he may have.  He denies ever being a drinker.  I have encouraged continued cessation.    PAF (paroxysmal atrial fibrillation) (HCC)  Left ventricular hypertrophy  Asymmetric septal hypertrophy (HCC)  Irregular heart rhythm  Chronic diastolic CHF (congestive heart failure) (HCC)  These are all chronic problems for the patient.  He denies any cardiac symptoms today.  However, he does have a fairly extensive cardiac history and does not have a cardiologist at this time.  I discussed with that him that I feel is very important for him to get established with a cardiologist at this time.  I did note an irregular heart rhythm on exam today.  I suspect it was PACs or PVCs.  EKG was quite difficult to obtain today with in office machine.  Red flags and reasons to go to the emergency room were reviewed with patient at length.  -     REFERRAL TO CARDIOLOGY   -      EKG    EKG Interpretation 11/27/2018  Interpreted by me  Rhythm: normal sinus with intermittent PAC  Rate: 61  Axis: LAD  Ectopy: none   Conduction: Borderline prolonged KS interval  ST Segments: no acute change   T Waves: no acute change   Q Waves: none   Clinical Impression: no acute changes    See EKG scanned chart.       Essential hypertension  This is a chronic well-controlled problem for the  patient.  He takes amlodipine, Cardura, lisinopril.  He will continue these medications for now.  -     doxazosin (CARDURA) 1 MG Tab; TAKE ONE TABLET BY MOUTH ONCE DAILY    Close follow-up pending lab results.    Louisa Garvey DO  [facility]

## 2018-12-03 ENCOUNTER — HOSPITAL ENCOUNTER (OUTPATIENT)
Dept: LAB | Facility: MEDICAL CENTER | Age: 58
End: 2018-12-03
Attending: FAMILY MEDICINE
Payer: MEDICARE

## 2018-12-03 DIAGNOSIS — E11.9 DIABETES MELLITUS WITHOUT COMPLICATION (HCC): ICD-10-CM

## 2018-12-03 DIAGNOSIS — Z00.00 PREVENTATIVE HEALTH CARE: ICD-10-CM

## 2018-12-03 DIAGNOSIS — E66.9 OBESITY (BMI 30-39.9): ICD-10-CM

## 2018-12-03 LAB
ALBUMIN SERPL BCP-MCNC: 3.7 G/DL (ref 3.2–4.9)
ALBUMIN/GLOB SERPL: 1.1 G/DL
ALP SERPL-CCNC: 126 U/L (ref 30–99)
ALT SERPL-CCNC: 58 U/L (ref 2–50)
ANION GAP SERPL CALC-SCNC: 8 MMOL/L (ref 0–11.9)
AST SERPL-CCNC: 34 U/L (ref 12–45)
BASOPHILS # BLD AUTO: 0.6 % (ref 0–1.8)
BASOPHILS # BLD: 0.03 K/UL (ref 0–0.12)
BILIRUB SERPL-MCNC: 0.8 MG/DL (ref 0.1–1.5)
BUN SERPL-MCNC: 22 MG/DL (ref 8–22)
CALCIUM SERPL-MCNC: 9.2 MG/DL (ref 8.5–10.5)
CHLORIDE SERPL-SCNC: 104 MMOL/L (ref 96–112)
CHOLEST SERPL-MCNC: 164 MG/DL (ref 100–199)
CO2 SERPL-SCNC: 31 MMOL/L (ref 20–33)
CREAT SERPL-MCNC: 1.2 MG/DL (ref 0.5–1.4)
EOSINOPHIL # BLD AUTO: 0.2 K/UL (ref 0–0.51)
EOSINOPHIL NFR BLD: 4 % (ref 0–6.9)
ERYTHROCYTE [DISTWIDTH] IN BLOOD BY AUTOMATED COUNT: 44.3 FL (ref 35.9–50)
GLOBULIN SER CALC-MCNC: 3.5 G/DL (ref 1.9–3.5)
GLUCOSE SERPL-MCNC: 123 MG/DL (ref 65–99)
HCT VFR BLD AUTO: 46.8 % (ref 42–52)
HDLC SERPL-MCNC: 37 MG/DL
HGB BLD-MCNC: 16 G/DL (ref 14–18)
IMM GRANULOCYTES # BLD AUTO: 0.02 K/UL (ref 0–0.11)
IMM GRANULOCYTES NFR BLD AUTO: 0.4 % (ref 0–0.9)
LDLC SERPL CALC-MCNC: 109 MG/DL
LYMPHOCYTES # BLD AUTO: 1.7 K/UL (ref 1–4.8)
LYMPHOCYTES NFR BLD: 33.6 % (ref 22–41)
MCH RBC QN AUTO: 30.8 PG (ref 27–33)
MCHC RBC AUTO-ENTMCNC: 34.2 G/DL (ref 33.7–35.3)
MCV RBC AUTO: 90 FL (ref 81.4–97.8)
MONOCYTES # BLD AUTO: 0.49 K/UL (ref 0–0.85)
MONOCYTES NFR BLD AUTO: 9.7 % (ref 0–13.4)
NEUTROPHILS # BLD AUTO: 2.62 K/UL (ref 1.82–7.42)
NEUTROPHILS NFR BLD: 51.7 % (ref 44–72)
NRBC # BLD AUTO: 0 K/UL
NRBC BLD-RTO: 0 /100 WBC
PLATELET # BLD AUTO: 158 K/UL (ref 164–446)
PMV BLD AUTO: 11.3 FL (ref 9–12.9)
POTASSIUM SERPL-SCNC: 4 MMOL/L (ref 3.6–5.5)
PROT SERPL-MCNC: 7.2 G/DL (ref 6–8.2)
RBC # BLD AUTO: 5.2 M/UL (ref 4.7–6.1)
SODIUM SERPL-SCNC: 143 MMOL/L (ref 135–145)
T4 FREE SERPL-MCNC: 0.9 NG/DL (ref 0.53–1.43)
TRIGL SERPL-MCNC: 89 MG/DL (ref 0–149)
TSH SERPL DL<=0.005 MIU/L-ACNC: 13.26 UIU/ML (ref 0.38–5.33)
WBC # BLD AUTO: 5.1 K/UL (ref 4.8–10.8)

## 2018-12-03 PROCEDURE — 85025 COMPLETE CBC W/AUTO DIFF WBC: CPT

## 2018-12-03 PROCEDURE — 80053 COMPREHEN METABOLIC PANEL: CPT

## 2018-12-03 PROCEDURE — 84443 ASSAY THYROID STIM HORMONE: CPT

## 2018-12-03 PROCEDURE — 36415 COLL VENOUS BLD VENIPUNCTURE: CPT

## 2018-12-03 PROCEDURE — 83036 HEMOGLOBIN GLYCOSYLATED A1C: CPT | Mod: GA

## 2018-12-03 PROCEDURE — 84439 ASSAY OF FREE THYROXINE: CPT

## 2018-12-03 PROCEDURE — 80061 LIPID PANEL: CPT

## 2018-12-04 LAB
EST. AVERAGE GLUCOSE BLD GHB EST-MCNC: 128 MG/DL
HBA1C MFR BLD: 6.1 % (ref 0–5.6)

## 2018-12-05 ENCOUNTER — TELEPHONE (OUTPATIENT)
Dept: MEDICAL GROUP | Facility: PHYSICIAN GROUP | Age: 58
End: 2018-12-05

## 2018-12-05 NOTE — TELEPHONE ENCOUNTER
----- Message from Louisa Garvey D.O. sent at 12/5/2018  8:27 AM PST -----  Please call patient let him know that several of his labs were abnormal including his thyroid function, sugars, liver enzymes.  Please have him schedule follow-up appointment so we can discuss these labs.  He will need to start medication for his thyroid.

## 2018-12-05 NOTE — TELEPHONE ENCOUNTER
Phone Number Called: [phone] (home)    Message: Spoke to patients wife, informed them of message below. She is going to call back and schedule an appointment.     Left Message for patient to call back: no

## 2018-12-05 NOTE — TELEPHONE ENCOUNTER
Phone Number Called: [phone] (home)       Message: ANTONITCB      Left Message for patient to call back: yes

## 2018-12-11 ENCOUNTER — OFFICE VISIT (OUTPATIENT)
Dept: MEDICAL GROUP | Facility: PHYSICIAN GROUP | Age: 58
End: 2018-12-11
Payer: MEDICARE

## 2018-12-11 VITALS
SYSTOLIC BLOOD PRESSURE: 126 MMHG | HEIGHT: 68 IN | TEMPERATURE: 98.9 F | BODY MASS INDEX: 34.4 KG/M2 | DIASTOLIC BLOOD PRESSURE: 72 MMHG | WEIGHT: 227 LBS | HEART RATE: 72 BPM | OXYGEN SATURATION: 96 %

## 2018-12-11 DIAGNOSIS — R79.89 ELEVATED LFTS: ICD-10-CM

## 2018-12-11 DIAGNOSIS — E03.9 ACQUIRED HYPOTHYROIDISM: ICD-10-CM

## 2018-12-11 PROCEDURE — 99214 OFFICE O/P EST MOD 30 MIN: CPT | Performed by: FAMILY MEDICINE

## 2018-12-11 RX ORDER — LEVOTHYROXINE SODIUM 88 UG/1
88 TABLET ORAL
Qty: 30 TAB | Refills: 3 | Status: SHIPPED | OUTPATIENT
Start: 2018-12-11 | End: 2019-04-06 | Stop reason: SDUPTHER

## 2018-12-11 RX ORDER — LEVOTHYROXINE SODIUM 0.1 MG/1
100 TABLET ORAL
Qty: 30 TAB | Refills: 3 | Status: CANCELLED | OUTPATIENT
Start: 2018-12-11

## 2018-12-12 NOTE — PROGRESS NOTES
CC: Hypothyroidism    HISTORY OF THE PRESENT ILLNESS: Patient is a 58 y.o. male. This pleasant patient is here today to discuss lab results.    Hypothyroidism: This is a new issue for the patient.  His recent labs showed elevated TSH and a low normal T4.  He does report fatigue, weight gain, constipation, dry skin.  He is open to starting medication for it at this time.    Elevated LFTs: This is a chronic problem for the patient.  His liver enzymes have been elevated for about 4 years now.  There is some question of cirrhosis in the past as well.  I have offered a right upper quadrant ultrasound today.  Patient reports that he is quite financially limited living on about $1000 a month.  He prefers to decline the ultrasound at this time.  He is going to think about it and continue at a later date.    Allergies: Patient has no known allergies.    Current Outpatient Prescriptions Ordered in [facility]   Medication Sig Dispense Refill   • Magnesium Hydroxide (MILK OF MAGNESIA PO) Take  by mouth.     • levothyroxine (SYNTHROID) 88 MCG Tab Take 1 Tab by mouth Every morning on an empty stomach. 30 Tab 3   • Blood Glucose Test Strips Test sugar daily. 100 Strip 2   • doxazosin (CARDURA) 1 MG Tab TAKE ONE TABLET BY MOUTH ONCE DAILY 90 Tab 3   • lisinopril (PRINIVIL) 20 MG Tab Take 1 Tab by mouth every day. 30 Tab 3   • famotidine (PEPCID) 20 MG Tab TAKE ONE TABLET BY MOUTH TWICE DAILY 60 Tab 5   • amLODIPine (NORVASC) 10 MG Tab TAKE ONE TABLET BY MOUTH ONCE DAILY VIA G TUBE 90 Tab 0   • oxycodone (OXY-IR) 15 MG immediate release tablet        No current Epic-ordered facility-administered medications on file.        Past Medical History:   Diagnosis Date   • Anxiety    • Backpain 2001    lower back pain   • Blood transfusion without reported diagnosis    • Cancer (HCC)    • CATARACT oct 24 2013    righteye cataract surgery   • CHF (congestive heart failure) (HCC)    • COPD (chronic obstructive pulmonary disease) MILD 7/5/2011     home o2 3 liters at evening   • CVA (cerebral infarction)     10/31/2012 /right facial droop and slurredspeech/ right leg weakness   • Dental disorder     dentures   • Depression    • Diabetes 2007    possible   • GERD (gastroesophageal reflux disease)    • Hyperlipidemia    • Hypertension 2010   • Hypertr obst cardiomyop    • PAF (paroxysmal atrial fibrillation) ([facility]) 10/30/2013   • Psychiatric problem     history of meth use   • Sleep apnea        Past Surgical History:   Procedure Laterality Date   • LARYNGOSCOPY N/A 8/12/2015    Procedure: LARYNGOSCOPY ;  Surgeon: Cesar Wood M.D.;  Location: SURGERY SAME DAY [facility];  Service:    • ESOPHAGOSCOPY N/A 8/12/2015    Procedure: ESOPHAGOSCOPY ;  Surgeon: Cesar Wood M.D.;  Location: SURGERY SAME DAY [facility];  Service:    • CATH PLACEMENT  10/20/2014    Performed by Christina Frias M.D. at SURGERY [facility] ORS   • GASTROSTOMY LAPAROSCOPIC  10/11/2014    Performed by Christina Frias M.D. at SURGERY [facility] ORS   • TRACHEOSTOMY  10/11/2014    Performed by Christina Frias M.D. at SURGERY [facility] ORS   • LARYNGOSCOPY  10/8/2014    Performed by Cesar Wood M.D. at SURGERY SAME DAY [facility] ORS   • ESOPHAGOSCOPY  10/8/2014    Performed by Cesar Wood M.D. at SURGERY SAME DAY [facility] ORS   • BIOPSY GENERAL  10/8/2014    Performed by Cesar Wood M.D. at SURGERY SAME DAY [facility] ORS   • TRACHEOSTOMY  10/8/2014    Performed by Cesar Wood M.D. at SURGERY SAME DAY [facility] ORS   • CUONG BY LAPAROSCOPY  2008   • CHOLECYSTECTOMY     • OTHER      right cataract       Social History   Substance Use Topics   • Smoking status: Never Smoker   • Smokeless tobacco: Never Used   • Alcohol use No       Social History     Social History Narrative   • No narrative on file       Family History   Problem Relation Age of Onset   • Stroke Mother    • Lung Disease Neg Hx    • Cancer Neg Hx    • Diabetes Neg Hx    • Heart Disease Neg Hx   "      ROS:   See HPI      Labs: Labs from December 3, 2018 reviewed and questions answered with patient.     Exam: Blood pressure 126/72, pulse 72, temperature 37.2 °C (98.9 °F), temperature source Temporal, height 1.727 m (5' 8\"), weight 103 kg (227 lb), SpO2 96 %. Body mass index is 34.52 kg/m².    General: Chronically ill-appearing, ambulating with cane.  Psych: Normal mood and affect. Alert and oriented. Judgment and insight is normal.    Please note that this dictation was created using voice recognition software. I have made every reasonable attempt to correct obvious errors, but I expect that there are errors of grammar and possibly content that I did not discover before finalizing the note.      Assessment/Plan  Neptali was seen today for results.    Diagnoses and all orders for this visit:    Acquired hypothyroidism   This is a new problem for the patient.  I am going to go ahead and start Synthroid.  We will recheck TSH in about 6 weeks.  -     TSH WITH REFLEX TO FT4; Future  -     levothyroxine (SYNTHROID) 88 MCG Tab; Take 1 Tab by mouth Every morning on an empty stomach.    Elevated LFTs  This is a chronic problem for the patient.  We did discuss reason to order additional tests such as additional labs including hepatitis, as well as right upper quadrant ultrasound to assess for possible cirrhosis or fatty liver disease.  Patient does desire to get these test but is severely financially limited.  He declines further testing related to his liver at this time.  I did discuss how this would change his management including that if he has cirrhosis, we would likely be doing serial ultrasounds and lab work to monitor for liver cancer as well as recommend additional vaccines.  He is going to think about it and possibly get it at a later date.    Other orders  -     Blood Glucose Test Strips; Test sugar daily.    Counseling: Patient was seen for a total of 15 minutes face-to-face by myself, with more than half of " the time spent counseling on meaning of hypothyroidism and discussion regarding possible causes for elevated liver function tests and further diagnostic tests that we may pursue in the future should the patient choose to do so.    Follow-up in 3 months.    Louisa Garvey DO  [facility]

## 2019-01-09 RX ORDER — AMLODIPINE BESYLATE 10 MG/1
10 TABLET ORAL DAILY
Qty: 90 TAB | Refills: 4 | Status: SHIPPED | OUTPATIENT
Start: 2019-01-09 | End: 2020-03-23

## 2019-02-20 RX ORDER — DOXAZOSIN MESYLATE 1 MG/1
TABLET ORAL
Qty: 90 TAB | Refills: 0 | Status: SHIPPED | OUTPATIENT
Start: 2019-02-20 | End: 2019-02-28 | Stop reason: SDUPTHER

## 2019-02-20 NOTE — TELEPHONE ENCOUNTER
Was the patient seen in the last year in this department? Yes    Does patient have an active prescription for medications requested? No     Received Request Via: Pharmacy      Pt met protocol?: Yes, labs and ov 12/18 pcp, appt pcp 3/19

## 2019-02-26 ENCOUNTER — HOSPITAL ENCOUNTER (OUTPATIENT)
Dept: LAB | Facility: MEDICAL CENTER | Age: 59
End: 2019-02-26
Attending: FAMILY MEDICINE
Payer: MEDICARE

## 2019-02-26 DIAGNOSIS — E03.9 ACQUIRED HYPOTHYROIDISM: ICD-10-CM

## 2019-02-26 LAB — TSH SERPL DL<=0.005 MIU/L-ACNC: 2.86 UIU/ML (ref 0.38–5.33)

## 2019-02-26 PROCEDURE — 36415 COLL VENOUS BLD VENIPUNCTURE: CPT

## 2019-02-26 PROCEDURE — 84443 ASSAY THYROID STIM HORMONE: CPT

## 2019-02-28 NOTE — TELEPHONE ENCOUNTER
Was the patient seen in the last year in this department? Yes    Does patient have an active prescription for medications requested? No     Received Request Via: Pharmacy      Pt met protocol?: Yes    OV 12/18

## 2019-03-01 RX ORDER — DOXAZOSIN MESYLATE 1 MG/1
TABLET ORAL
Qty: 90 TAB | Refills: 0 | Status: SHIPPED | OUTPATIENT
Start: 2019-03-01 | End: 2019-03-11

## 2019-03-04 DIAGNOSIS — E11.9 DIABETES MELLITUS WITHOUT COMPLICATION (HCC): ICD-10-CM

## 2019-03-05 RX ORDER — LISINOPRIL 20 MG/1
20 TABLET ORAL DAILY
Qty: 90 TAB | Refills: 1 | Status: SHIPPED | OUTPATIENT
Start: 2019-03-05 | End: 2019-08-28 | Stop reason: SDUPTHER

## 2019-03-05 NOTE — TELEPHONE ENCOUNTER
Refill X 6 months, sent to pharmacy.Pt. Seen in the last 6 months per protocol.   Lab Results   Component Value Date/Time    SODIUM 143 12/03/2018 01:01 PM    POTASSIUM 4.0 12/03/2018 01:01 PM    CHLORIDE 104 12/03/2018 01:01 PM    CO2 31 12/03/2018 01:01 PM    GLUCOSE 123 (H) 12/03/2018 01:01 PM    BUN 22 12/03/2018 01:01 PM    CREATININE 1.20 12/03/2018 01:01 PM

## 2019-03-05 NOTE — TELEPHONE ENCOUNTER
Was the patient seen in the last year in this department? Yes    Does patient have an active prescription for medications requested? No     Received Request Via: Pharmacy      Pt met protocol?: Yes    OV 12/18     BP Readings from Last 1 Encounters:   12/11/18 126/72

## 2019-03-11 ENCOUNTER — HOSPITAL ENCOUNTER (OUTPATIENT)
Facility: MEDICAL CENTER | Age: 59
End: 2019-03-11
Attending: FAMILY MEDICINE
Payer: MEDICARE

## 2019-03-11 ENCOUNTER — OFFICE VISIT (OUTPATIENT)
Dept: MEDICAL GROUP | Facility: PHYSICIAN GROUP | Age: 59
End: 2019-03-11
Payer: MEDICARE

## 2019-03-11 VITALS
HEIGHT: 68 IN | OXYGEN SATURATION: 97 % | BODY MASS INDEX: 35.01 KG/M2 | TEMPERATURE: 98.7 F | DIASTOLIC BLOOD PRESSURE: 76 MMHG | WEIGHT: 231 LBS | HEART RATE: 62 BPM | SYSTOLIC BLOOD PRESSURE: 128 MMHG

## 2019-03-11 DIAGNOSIS — R35.1 BENIGN PROSTATIC HYPERPLASIA WITH NOCTURIA: ICD-10-CM

## 2019-03-11 DIAGNOSIS — K74.60 HEPATIC CIRRHOSIS, UNSPECIFIED HEPATIC CIRRHOSIS TYPE (HCC): ICD-10-CM

## 2019-03-11 DIAGNOSIS — R79.89 ELEVATED LFTS: ICD-10-CM

## 2019-03-11 DIAGNOSIS — N40.1 BENIGN PROSTATIC HYPERPLASIA WITH NOCTURIA: ICD-10-CM

## 2019-03-11 DIAGNOSIS — E11.9 TYPE 2 DIABETES MELLITUS WITHOUT COMPLICATION, WITHOUT LONG-TERM CURRENT USE OF INSULIN (HCC): ICD-10-CM

## 2019-03-11 DIAGNOSIS — E03.9 ACQUIRED HYPOTHYROIDISM: ICD-10-CM

## 2019-03-11 LAB
CREAT UR-MCNC: 206.3 MG/DL
MICROALBUMIN UR-MCNC: 3.5 MG/DL
MICROALBUMIN/CREAT UR: 17 MG/G (ref 0–30)

## 2019-03-11 PROCEDURE — 99214 OFFICE O/P EST MOD 30 MIN: CPT | Performed by: FAMILY MEDICINE

## 2019-03-11 PROCEDURE — 99000 SPECIMEN HANDLING OFFICE-LAB: CPT | Performed by: FAMILY MEDICINE

## 2019-03-11 PROCEDURE — 82043 UR ALBUMIN QUANTITATIVE: CPT

## 2019-03-11 PROCEDURE — 82570 ASSAY OF URINE CREATININE: CPT

## 2019-03-11 RX ORDER — TAMSULOSIN HYDROCHLORIDE 0.4 MG/1
0.4 CAPSULE ORAL
Qty: 90 CAP | Refills: 3 | Status: SHIPPED | OUTPATIENT
Start: 2019-03-11 | End: 2020-03-27

## 2019-03-11 ASSESSMENT — PATIENT HEALTH QUESTIONNAIRE - PHQ9: CLINICAL INTERPRETATION OF PHQ2 SCORE: 0

## 2019-03-11 NOTE — PROGRESS NOTES
CC: Hypothyroidism, diabetes    HISTORY OF THE PRESENT ILLNESS: Patient is a 58 y.o. male. This pleasant patient is here today to follow-up on the following health issues.    Type 2 diabetes: Chronic issue for the patient but well controlled.  Patient reports that his sugars are typically running around the 100s when he does check them at home.  He is due for urine microalbumin, eye exam, foot exam today.  He refuses both eye exam and foot exam even though we can get him in clinic today here.    Hypothyroidism: Patient started on Synthroid in December.  About a month ago his TSH was rechecked and back within the normal range.  He reports decreased appetite now that he is on the Synthroid.  However, he is not noticed any other difference on the medication.    BPH: Chronic issue for the patient.  He did used to be on Flomax.  When he had a G-tube, he had to be switched to doxazosin.  He no longer has G-tube and desires to go back on the Flomax.  He states the doxazosin does not help a lot and he still gets up multiple times at night to urinate.    Cirrhosis, elevated liver function test: Patient noted to have cirrhosis in the past.  He is not a drinker.  We have addressed this multiple times during his appointments in terms of getting an ultrasound to assess the severity of his liver disease as well as additional labs to check for liver cancer in vaccines related to having cirrhosis.  Again patient adamantly refuses stating due to financial concerns.    Allergies: Patient has no known allergies.    Current Outpatient Prescriptions Ordered in [facility]   Medication Sig Dispense Refill   • tamsulosin (FLOMAX) 0.4 MG capsule Take 1 Cap by mouth ONE-HALF HOUR AFTER BREAKFAST. 90 Cap 3   • lisinopril (PRINIVIL) 20 MG Tab Take 1 Tab by mouth every day. 90 Tab 1   • amLODIPine (NORVASC) 10 MG Tab Take 1 Tab by mouth every day. 90 Tab 4   • levothyroxine (SYNTHROID) 88 MCG Tab Take 1 Tab by mouth Every morning on an empty stomach.  30 Tab 3   • famotidine (PEPCID) 20 MG Tab TAKE ONE TABLET BY MOUTH TWICE DAILY 60 Tab 5   • oxycodone (OXY-IR) 15 MG immediate release tablet      • Magnesium Hydroxide (MILK OF MAGNESIA PO) Take  by mouth.     • Blood Glucose Test Strips Test sugar daily. 100 Strip 2     No current Epic-ordered facility-administered medications on file.        Past Medical History:   Diagnosis Date   • Anxiety    • Backpain 2001    lower back pain   • Blood transfusion without reported diagnosis    • Cancer ([facility])    • CATARACT oct 24 2013    righteye cataract surgery   • CHF (congestive heart failure) ([facility])    • COPD (chronic obstructive pulmonary disease) MILD 7/5/2011    home o2 3 liters at evening   • CVA (cerebral infarction)     10/31/2012 /right facial droop and slurredspeech/ right leg weakness   • Dental disorder     dentures   • Depression    • Diabetes 2007    possible   • GERD (gastroesophageal reflux disease)    • Hyperlipidemia    • Hypertension 2010   • Hypertr obst cardiomyop    • PAF (paroxysmal atrial fibrillation) ([facility]) 10/30/2013   • Psychiatric problem     history of meth use   • Sleep apnea        Past Surgical History:   Procedure Laterality Date   • LARYNGOSCOPY N/A 8/12/2015    Procedure: LARYNGOSCOPY ;  Surgeon: Cesar Wood M.D.;  Location: SURGERY SAME DAY [facility];  Service:    • ESOPHAGOSCOPY N/A 8/12/2015    Procedure: ESOPHAGOSCOPY ;  Surgeon: Cesar Wood M.D.;  Location: SURGERY SAME DAY [facility];  Service:    • CATH PLACEMENT  10/20/2014    Performed by Christina Frias M.D. at SURGERY [facility]   • GASTROSTOMY LAPAROSCOPIC  10/11/2014    Performed by Christina Frias M.D. at SURGERY [facility] ORS   • TRACHEOSTOMY  10/11/2014    Performed by Christina Frisa M.D. at SURGERY [facility] ORS   • LARYNGOSCOPY  10/8/2014    Performed by Cesar Wood M.D. at SURGERY SAME DAY [facility]   • ESOPHAGOSCOPY  10/8/2014    Performed by Cesar Wood M.D. at SURGERY SAME DAY  [facility] ORS   • BIOPSY GENERAL  10/8/2014    Performed by Cesar Wood M.D. at SURGERY [facility] DAY [facility] ORS   • TRACHEOSTOMY  10/8/2014    Performed by Cesar Wood M.D. at SURGERY SAME DAY [facility] ORS   • CUONG BY LAPAROSCOPY  2008   • CHOLECYSTECTOMY     • OTHER      right cataract       Social History   Substance Use Topics   • Smoking status: Never Smoker   • Smokeless tobacco: Never Used   • Alcohol use No       Social History     Social History Narrative   • No narrative on file       Family History   Problem Relation Age of Onset   • Stroke Mother    • Lung Disease Neg Hx    • Cancer Neg Hx    • Diabetes Neg Hx    • Heart Disease Neg Hx        ROS:     - Respiratory: Negative for cough, sputum production, chest congestion, dyspnea, wheezing, and crackles.      - Cardiovascular: Negative for chest pain, palpitations, orthopnea, PND, and bilateral lower extremity edema.     - Gastrointestinal: Negative for heartburn, nausea, vomiting, abdominal pain, hematochezia, melena, diarrhea, constipation, and greasy/foul-smelling stools.     Exam: Blood pressure 128/76, pulse 62, temperature 37.1 °C (98.7 °F), temperature source Temporal, height 1.727 m (5' 8\"), weight 104.8 kg (231 lb), SpO2 97 %. Body mass index is 35.12 kg/m².    General: Well appearing, NAD  Pulmonary: Clear to ausculation.  Normal effort. No rales, ronchi, or wheezing.  Cardiovascular: Regular rate and rhythm without murmur, rubs or gallop.  No lower extremity edema.  Abdomen: Soft, nontender, nondistended. Normal bowel sounds. Liver and spleen are not palpable. No rebound or guarding  Psych: Normal mood and affect. Alert and oriented.  Poor insight into severity of medical comorbidities.    Please note that this dictation was created using voice recognition software. I have made every reasonable attempt to correct obvious errors, but I expect that there are errors of grammar and possibly content that I did not discover before finalizing " "the note.      Assessment/Plan  Neptali was seen today for hypothyroidism.    Diagnoses and all orders for this visit:    Type 2 diabetes mellitus without complication, without long-term current use of insulin (HCC)  Chronic well-controlled problem for the patient, diet controlled.  Check microalbumin today.  Refused eye exam and foot exam in clinic today.  -     MICROALBUMIN CREAT RATIO URINE (CLINIC COLLECT); Future    Benign prostatic hyperplasia with nocturia  Chronic poorly controlled problem for the patient.  We will go ahead and switch back to Flomax from doxazosin.  -     tamsulosin (FLOMAX) 0.4 MG capsule; Take 1 Cap by mouth ONE-HALF HOUR AFTER BREAKFAST.    Acquired hypothyroidism  Chronic now well-controlled problem for the patient.  Continue current dose of Synthroid.    Elevated LFTs  Hepatic cirrhosis, unspecified hepatic cirrhosis type (HCC)  Chronic uncontrolled problem for the patient.  Today he refuses further workup related to this as per HPI.    Extended discussion was had with patient today related to his multiple medical comorbidities.  We discussed need for routine lab monitoring, further workup of liver disease, specialist referral such as to cardiology or pulmonology, preventative care in the setting of diabetes such as eye exam and foot exam.  Patient is refusing all of the above today stating that specialist are too expensive and that he \"feels fine\" so he feels he does not need all these extra lab tests.  Importance of preventative care discussed with patient at length, but he still refused.      Follow-up in about 3 months or sooner if needed.    Louisa Garvey,   [facility]      "

## 2019-03-15 ENCOUNTER — HOSPITAL ENCOUNTER (OUTPATIENT)
Dept: RADIATION ONCOLOGY | Facility: MEDICAL CENTER | Age: 59
End: 2019-03-31
Attending: RADIOLOGY
Payer: MEDICARE

## 2019-03-15 VITALS
WEIGHT: 230.1 LBS | TEMPERATURE: 99.1 F | OXYGEN SATURATION: 93 % | BODY MASS INDEX: 34.99 KG/M2 | SYSTOLIC BLOOD PRESSURE: 128 MMHG | DIASTOLIC BLOOD PRESSURE: 75 MMHG | HEART RATE: 70 BPM

## 2019-03-15 PROCEDURE — 99213 OFFICE O/P EST LOW 20 MIN: CPT | Mod: 25 | Performed by: RADIOLOGY

## 2019-03-15 PROCEDURE — 31575 DIAGNOSTIC LARYNGOSCOPY: CPT | Performed by: RADIOLOGY

## 2019-03-15 PROCEDURE — 99212 OFFICE O/P EST SF 10 MIN: CPT | Mod: 25 | Performed by: RADIOLOGY

## 2019-03-15 ASSESSMENT — PAIN SCALES - GENERAL: PAINLEVEL: 2=MINIMAL-SLIGHT

## 2019-03-15 NOTE — PROGRESS NOTES
RADIATION ONCOLOGY FOLLOW-UP    DATE OF SERVICE:   3/15/2019    IDENTIFICATION:   A 58 y.o. 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 He presented with airway compromise requiring emergent trach and ventilator support. He received 1 cycle of induction chemotherapy to allow weaning off ventilator followed by concurrent chemoradiotherapy cisplatin-based 70 grade 35 fractions 45 elapsed days completed January 30, 2015.  HISTORY OF PRESENT ILLNESS:   Scheduled follow-up visit.  Overall continues to feel well.  Recently started on thyroid replacement.  No significant dysphagia or odynophagia.  Still has xerostomia.      CURRENT MEDICATIONS:  Current Outpatient Prescriptions   Medication Sig Dispense Refill   • tamsulosin (FLOMAX) 0.4 MG capsule Take 1 Cap by mouth ONE-HALF HOUR AFTER BREAKFAST. 90 Cap 3   • lisinopril (PRINIVIL) 20 MG Tab Take 1 Tab by mouth every day. 90 Tab 1   • amLODIPine (NORVASC) 10 MG Tab Take 1 Tab by mouth every day. 90 Tab 4   • Magnesium Hydroxide (MILK OF MAGNESIA PO) Take  by mouth.     • levothyroxine (SYNTHROID) 88 MCG Tab Take 1 Tab by mouth Every morning on an empty stomach. 30 Tab 3   • Blood Glucose Test Strips Test sugar daily. 100 Strip 2   • famotidine (PEPCID) 20 MG Tab TAKE ONE TABLET BY MOUTH TWICE DAILY 60 Tab 5   • oxycodone (OXY-IR) 15 MG immediate release tablet        No current facility-administered medications for this encounter.        ALLERGIES:  Patient has no known allergies.    REVIEW OF SYSTEMS:  A review of systems for today's date of service was reviewed and uploaded into the electronic medical record.    PHYSICAL EXAM:   /75   Pulse 70   Temp 37.3 °C (99.1 °F)   Wt 104.4 kg (230 lb 1.6 oz)   SpO2 93%   BMI 34.99 kg/m²   GENERAL: Alert oriented no acute distress  HEENT:  Pupils are equal, round, and reactive to light.  Extraocular muscles   are intact. Sclerae nonicteric.  Conjunctivae pink.  Oral  cavity, tongue   protrudes midline.  Edentulous.  NECK: Induration bilaterally with full range of motion  NODES:  No peripheral adenopathy of the neck, supraclavicular fossa or axillae   bilaterally.      FIBEROPTIC EXAM:  See separate report.  No tumor.    LABORATORY DATA:   Lab Results   Component Value Date/Time    SODIUM 143 12/03/2018 01:01 PM    POTASSIUM 4.0 12/03/2018 01:01 PM    CHLORIDE 104 12/03/2018 01:01 PM    CO2 31 12/03/2018 01:01 PM    GLUCOSE 123 (H) 12/03/2018 01:01 PM    BUN 22 12/03/2018 01:01 PM    CREATININE 1.20 12/03/2018 01:01 PM     Lab Results   Component Value Date/Time    ALKPHOSPHAT 126 (H) 12/03/2018 01:01 PM    ASTSGOT 34 12/03/2018 01:01 PM    ALTSGPT 58 (H) 12/03/2018 01:01 PM    TBILIRUBIN 0.8 12/03/2018 01:01 PM      Lab Results   Component Value Date/Time    WBC 5.1 12/03/2018 01:01 PM    RBC 5.20 12/03/2018 01:01 PM    HEMOGLOBIN 16.0 12/03/2018 01:01 PM    HEMATOCRIT 46.8 12/03/2018 01:01 PM    MCV 90.0 12/03/2018 01:01 PM    MCH 30.8 12/03/2018 01:01 PM    MCHC 34.2 12/03/2018 01:01 PM    MPV 11.3 12/03/2018 01:01 PM    NEUTSPOLYS 51.70 12/03/2018 01:01 PM    LYMPHOCYTES 33.60 12/03/2018 01:01 PM    MONOCYTES 9.70 12/03/2018 01:01 PM    EOSINOPHILS 4.00 12/03/2018 01:01 PM    BASOPHILS 0.60 12/03/2018 01:01 PM    HYPOCHROMIA 1+ 12/14/2014 01:00 AM    ANISOCYTOSIS 1+ 03/02/2015 02:55 AM        RADIOLOGY DATA:  No results found.    IMPRESSION:    A 58 y.o. with P 16+ locally advanced squamous cell cancer involving the base of tongue.  Status post chemoradiotherapy clinically without evidence of disease.    RECOMMENDATIONS:   Reviewed fiberoptic evaluation with patient.  At this point we will discharge him from the clinic and see him back on an as-needed basis.    Thank you for the opportunity to participate in his care.  If any questions or comments, please do not hesitate in calling.    Sangeeta MARTINEZ M.D.  Electronically signed by: Sangeeta Naqvi V, 3/15/2019 10:38  AM  [phone]

## 2019-03-15 NOTE — NON-PROVIDER
Patient was seen today in clinic with Dr. Naqvi for follow up.  Vitals signs and weight were obtained and pain assessment was completed.  Allergies and medications were reviewed with the patient.  Review of systems completed.     Vitals/Pain:  There were no vitals filed for this visit.         Allergies:   Patient has no known allergies.    Current Medications:  Current Outpatient Prescriptions   Medication Sig Dispense Refill   • tamsulosin (FLOMAX) 0.4 MG capsule Take 1 Cap by mouth ONE-HALF HOUR AFTER BREAKFAST. 90 Cap 3   • lisinopril (PRINIVIL) 20 MG Tab Take 1 Tab by mouth every day. 90 Tab 1   • amLODIPine (NORVASC) 10 MG Tab Take 1 Tab by mouth every day. 90 Tab 4   • Magnesium Hydroxide (MILK OF MAGNESIA PO) Take  by mouth.     • levothyroxine (SYNTHROID) 88 MCG Tab Take 1 Tab by mouth Every morning on an empty stomach. 30 Tab 3   • Blood Glucose Test Strips Test sugar daily. 100 Strip 2   • famotidine (PEPCID) 20 MG Tab TAKE ONE TABLET BY MOUTH TWICE DAILY 60 Tab 5   • oxycodone (OXY-IR) 15 MG immediate release tablet        No current facility-administered medications for this encounter.          PCP:  Mare Parisi, Med Ass't

## 2019-03-15 NOTE — PROCEDURES
DATE OF SERVICE: 03/15/19        Fiberoptic Naso-pharyngoscopy Note      Flexible fiberoptic exam performed after anesthesia of left nostril and oropharynx.    Nasopharynx:  Eustachian canal opening, torus, fossa of Rosenmuller normal     Oropharynx:   Base of tongue, vallecula,  PE folds normal.  Epiglottis rotated and omega shaped,    Larynx:  AE folds, false vocal folds, arytenoids normal    Cords meet at midline on the phonation of vowel E.      Piriform sinuses showed no masses.      Sangeeta MARTINEZ M.D.  Electronically signed by: Sangeeta Naqvi V, 3/15/2019 10:39 AM  [phone]  .[email or web address]

## 2019-04-06 DIAGNOSIS — E03.9 ACQUIRED HYPOTHYROIDISM: ICD-10-CM

## 2019-04-08 NOTE — TELEPHONE ENCOUNTER
Was the patient seen in the last year in this department? Yes    Does patient have an active prescription for medications requested? No     Received Request Via: Pharmacy    Pt met protocol?: Yes     Last OV 03/11/2019    TSH   Date Value Ref Range Status   02/26/2019 2.860 0.380 - 5.330 uIU/mL Final     Comment:     Please note new reference ranges effective 12/14/2017 10:00 AM  Pregnant Females, 1st Trimester  0.050-3.700  Pregnant Females, 2nd Trimester  0.310-4.350  Pregnant Females, 3rd Trimester  0.410-5.180

## 2019-04-09 RX ORDER — LEVOTHYROXINE SODIUM 88 UG/1
TABLET ORAL
Qty: 90 TAB | Refills: 3 | Status: SHIPPED | OUTPATIENT
Start: 2019-04-09 | End: 2020-05-27

## 2019-07-16 ENCOUNTER — HOSPITAL ENCOUNTER (OUTPATIENT)
Dept: LAB | Facility: MEDICAL CENTER | Age: 59
End: 2019-07-16
Attending: NURSE PRACTITIONER
Payer: MEDICARE

## 2019-07-16 LAB
ALBUMIN SERPL BCP-MCNC: 3.6 G/DL (ref 3.2–4.9)
ALBUMIN/GLOB SERPL: 1 G/DL
ALP SERPL-CCNC: 133 U/L (ref 30–99)
ALT SERPL-CCNC: 82 U/L (ref 2–50)
ANION GAP SERPL CALC-SCNC: 5 MMOL/L (ref 0–11.9)
APPEARANCE UR: CLEAR
AST SERPL-CCNC: 59 U/L (ref 12–45)
BACTERIA #/AREA URNS HPF: NEGATIVE /HPF
BASOPHILS # BLD AUTO: 0.6 % (ref 0–1.8)
BASOPHILS # BLD: 0.03 K/UL (ref 0–0.12)
BILIRUB SERPL-MCNC: 0.8 MG/DL (ref 0.1–1.5)
BILIRUB UR QL STRIP.AUTO: NEGATIVE
BUN SERPL-MCNC: 21 MG/DL (ref 8–22)
CALCIUM SERPL-MCNC: 9 MG/DL (ref 8.5–10.5)
CHLORIDE SERPL-SCNC: 104 MMOL/L (ref 96–112)
CO2 SERPL-SCNC: 30 MMOL/L (ref 20–33)
COLOR UR: YELLOW
CREAT SERPL-MCNC: 1.1 MG/DL (ref 0.5–1.4)
EOSINOPHIL # BLD AUTO: 0.23 K/UL (ref 0–0.51)
EOSINOPHIL NFR BLD: 4.4 % (ref 0–6.9)
EPI CELLS #/AREA URNS HPF: NEGATIVE /HPF
ERYTHROCYTE [DISTWIDTH] IN BLOOD BY AUTOMATED COUNT: 44.2 FL (ref 35.9–50)
GLOBULIN SER CALC-MCNC: 3.5 G/DL (ref 1.9–3.5)
GLUCOSE SERPL-MCNC: 109 MG/DL (ref 65–99)
GLUCOSE UR STRIP.AUTO-MCNC: NEGATIVE MG/DL
HAV IGM SERPL QL IA: NEGATIVE
HBV CORE IGM SER QL: NEGATIVE
HBV SURFACE AG SER QL: NEGATIVE
HCT VFR BLD AUTO: 47.1 % (ref 42–52)
HCV AB SER QL: NEGATIVE
HGB BLD-MCNC: 16 G/DL (ref 14–18)
HIV 1+2 AB+HIV1 P24 AG SERPL QL IA: NON REACTIVE
HYALINE CASTS #/AREA URNS LPF: ABNORMAL /LPF
IMM GRANULOCYTES # BLD AUTO: 0.02 K/UL (ref 0–0.11)
IMM GRANULOCYTES NFR BLD AUTO: 0.4 % (ref 0–0.9)
KETONES UR STRIP.AUTO-MCNC: NEGATIVE MG/DL
LEUKOCYTE ESTERASE UR QL STRIP.AUTO: ABNORMAL
LYMPHOCYTES # BLD AUTO: 1.82 K/UL (ref 1–4.8)
LYMPHOCYTES NFR BLD: 34.5 % (ref 22–41)
MCH RBC QN AUTO: 30.2 PG (ref 27–33)
MCHC RBC AUTO-ENTMCNC: 34 G/DL (ref 33.7–35.3)
MCV RBC AUTO: 89 FL (ref 81.4–97.8)
MICRO URNS: ABNORMAL
MONOCYTES # BLD AUTO: 0.53 K/UL (ref 0–0.85)
MONOCYTES NFR BLD AUTO: 10 % (ref 0–13.4)
NEUTROPHILS # BLD AUTO: 2.65 K/UL (ref 1.82–7.42)
NEUTROPHILS NFR BLD: 50.1 % (ref 44–72)
NITRITE UR QL STRIP.AUTO: NEGATIVE
NRBC # BLD AUTO: 0 K/UL
NRBC BLD-RTO: 0 /100 WBC
PH UR STRIP.AUTO: 6 [PH]
PLATELET # BLD AUTO: 178 K/UL (ref 164–446)
PMV BLD AUTO: 10.9 FL (ref 9–12.9)
POTASSIUM SERPL-SCNC: 3.8 MMOL/L (ref 3.6–5.5)
PROT SERPL-MCNC: 7.1 G/DL (ref 6–8.2)
PROT UR QL STRIP: NEGATIVE MG/DL
RBC # BLD AUTO: 5.29 M/UL (ref 4.7–6.1)
RBC # URNS HPF: ABNORMAL /HPF
RBC UR QL AUTO: ABNORMAL
SODIUM SERPL-SCNC: 139 MMOL/L (ref 135–145)
SP GR UR STRIP.AUTO: 1.01
UROBILINOGEN UR STRIP.AUTO-MCNC: 0.2 MG/DL
WBC # BLD AUTO: 5.3 K/UL (ref 4.8–10.8)
WBC #/AREA URNS HPF: ABNORMAL /HPF

## 2019-07-16 PROCEDURE — 81001 URINALYSIS AUTO W/SCOPE: CPT

## 2019-07-16 PROCEDURE — 80074 ACUTE HEPATITIS PANEL: CPT | Mod: GA

## 2019-07-16 PROCEDURE — G0475 HIV COMBINATION ASSAY: HCPCS | Mod: GA

## 2019-07-16 PROCEDURE — 36415 COLL VENOUS BLD VENIPUNCTURE: CPT

## 2019-07-16 PROCEDURE — 80053 COMPREHEN METABOLIC PANEL: CPT

## 2019-07-16 PROCEDURE — 85025 COMPLETE CBC W/AUTO DIFF WBC: CPT

## 2019-08-28 DIAGNOSIS — E11.9 DIABETES MELLITUS WITHOUT COMPLICATION (HCC): ICD-10-CM

## 2019-08-29 RX ORDER — LISINOPRIL 20 MG/1
TABLET ORAL
Qty: 90 TAB | Refills: 1 | Status: SHIPPED | OUTPATIENT
Start: 2019-08-29 | End: 2020-02-20

## 2019-08-29 NOTE — TELEPHONE ENCOUNTER
Refill X 6 months, sent to pharmacy.Pt. Seen in the last 6 months per protocol.   Lab Results   Component Value Date/Time    SODIUM 139 07/16/2019 08:03 AM    POTASSIUM 3.8 07/16/2019 08:03 AM    CHLORIDE 104 07/16/2019 08:03 AM    CO2 30 07/16/2019 08:03 AM    GLUCOSE 109 (H) 07/16/2019 08:03 AM    BUN 21 07/16/2019 08:03 AM    CREATININE 1.10 07/16/2019 08:03 AM

## 2019-08-29 NOTE — TELEPHONE ENCOUNTER
Was the patient seen in the last year in this department? Yes    Does patient have an active prescription for medications requested? No     Received Request Via: Pharmacy      Pt met protocol?: Yes   Pt last ov 3/19   BP Readings from Last 1 Encounters:   03/15/19 128/75

## 2020-02-20 DIAGNOSIS — E11.9 DIABETES MELLITUS WITHOUT COMPLICATION (HCC): ICD-10-CM

## 2020-02-20 NOTE — TELEPHONE ENCOUNTER
Was the patient seen in the last year in this department? Yes    Does patient have an active prescription for medications requested? No     Received Request Via: Pharmacy      Pt met protocol?: No, OV 3/19   BP Readings from Last 1 Encounters:   03/15/19 128/75

## 2020-02-21 RX ORDER — LISINOPRIL 20 MG/1
TABLET ORAL
Qty: 90 TAB | Refills: 0 | Status: SHIPPED | OUTPATIENT
Start: 2020-02-21 | End: 2020-05-21

## 2020-03-23 RX ORDER — AMLODIPINE BESYLATE 10 MG/1
TABLET ORAL
Qty: 90 TAB | Refills: 1 | Status: SHIPPED | OUTPATIENT
Start: 2020-03-23 | End: 2020-08-21 | Stop reason: SDUPTHER

## 2020-03-23 NOTE — TELEPHONE ENCOUNTER
Was the patient seen in the last year in this department? No     Does patient have an active prescription for medications requested? No     Received Request Via: Pharmacy      Pt met protocol?: No   Last ov 3/2019 has no upcoming appt   BP Readings from Last 1 Encounters:   03/15/19 128/75

## 2020-05-24 DIAGNOSIS — E03.9 ACQUIRED HYPOTHYROIDISM: ICD-10-CM

## 2020-05-27 RX ORDER — LEVOTHYROXINE SODIUM 88 UG/1
TABLET ORAL
Qty: 30 TAB | Refills: 0 | Status: SHIPPED | OUTPATIENT
Start: 2020-05-27 | End: 2020-08-21

## 2020-06-22 DIAGNOSIS — R35.1 BENIGN PROSTATIC HYPERPLASIA WITH NOCTURIA: ICD-10-CM

## 2020-06-22 DIAGNOSIS — N40.1 BENIGN PROSTATIC HYPERPLASIA WITH NOCTURIA: ICD-10-CM

## 2020-06-26 RX ORDER — TAMSULOSIN HYDROCHLORIDE 0.4 MG/1
0.4 CAPSULE ORAL DAILY
Qty: 30 CAP | Refills: 0 | Status: SHIPPED | OUTPATIENT
Start: 2020-06-26 | End: 2020-08-21 | Stop reason: SDUPTHER

## 2020-06-26 NOTE — TELEPHONE ENCOUNTER
Last seen by PCP 03/11/2019. Will send 1 month(s) to the pharmacy.  Pt was told 3/29/2020 to make appt and that has not been done. Please advise pt only 30 days will be sent to pharmacy and next request will be denied.

## 2020-06-29 DIAGNOSIS — E11.9 DIABETES MELLITUS WITHOUT COMPLICATION (HCC): ICD-10-CM

## 2020-07-01 RX ORDER — LISINOPRIL 20 MG/1
20 TABLET ORAL DAILY
Qty: 7 TAB | Refills: 0 | Status: SHIPPED | OUTPATIENT
Start: 2020-07-01 | End: 2020-08-14

## 2020-07-01 NOTE — TELEPHONE ENCOUNTER
Last seen by PCP 03/11/2019.Pt to make appt prior to more refills.     Last Blood Pressure reading was 128/75 on 3/15/2019    Lab Results   Component Value Date/Time    SODIUM 139 07/16/2019 08:03 AM    POTASSIUM 3.8 07/16/2019 08:03 AM    CHLORIDE 104 07/16/2019 08:03 AM    CO2 30 07/16/2019 08:03 AM    GLUCOSE 109 (H) 07/16/2019 08:03 AM    BUN 21 07/16/2019 08:03 AM    CREATININE 1.10 07/16/2019 08:03 AM       Been given multiple courtesy fills.

## 2020-08-13 DIAGNOSIS — E11.9 DIABETES MELLITUS WITHOUT COMPLICATION (HCC): ICD-10-CM

## 2020-08-14 ENCOUNTER — TELEPHONE (OUTPATIENT)
Dept: MEDICAL GROUP | Facility: PHYSICIAN GROUP | Age: 60
End: 2020-08-14

## 2020-08-14 DIAGNOSIS — E03.9 ACQUIRED HYPOTHYROIDISM: ICD-10-CM

## 2020-08-14 DIAGNOSIS — D55.9 ANEMIA DUE TO ENZYME DISORDER (HCC): ICD-10-CM

## 2020-08-14 DIAGNOSIS — E11.9 TYPE 2 DIABETES MELLITUS WITHOUT COMPLICATION, WITHOUT LONG-TERM CURRENT USE OF INSULIN (HCC): ICD-10-CM

## 2020-08-14 DIAGNOSIS — E55.9 VITAMIN D DEFICIENCY: ICD-10-CM

## 2020-08-14 RX ORDER — LISINOPRIL 20 MG/1
20 TABLET ORAL DAILY
Qty: 7 TAB | Refills: 0 | Status: SHIPPED | OUTPATIENT
Start: 2020-08-14 | End: 2020-08-21 | Stop reason: SDUPTHER

## 2020-08-14 NOTE — TELEPHONE ENCOUNTER
Dr. Garvey, Pt LVM stating he was advised to have labs done before medication refills. There are no labs in Pt chart and he is all out of medications. Please advise.

## 2020-08-18 ENCOUNTER — HOSPITAL ENCOUNTER (OUTPATIENT)
Dept: LAB | Facility: MEDICAL CENTER | Age: 60
End: 2020-08-18
Attending: FAMILY MEDICINE
Payer: MEDICARE

## 2020-08-18 DIAGNOSIS — E55.9 VITAMIN D DEFICIENCY: ICD-10-CM

## 2020-08-18 DIAGNOSIS — E11.9 TYPE 2 DIABETES MELLITUS WITHOUT COMPLICATION, WITHOUT LONG-TERM CURRENT USE OF INSULIN (HCC): ICD-10-CM

## 2020-08-18 DIAGNOSIS — D55.9 ANEMIA DUE TO ENZYME DISORDER (HCC): ICD-10-CM

## 2020-08-18 DIAGNOSIS — E03.9 ACQUIRED HYPOTHYROIDISM: ICD-10-CM

## 2020-08-18 LAB
25(OH)D3 SERPL-MCNC: 33 NG/ML (ref 30–100)
ALBUMIN SERPL BCP-MCNC: 3.9 G/DL (ref 3.2–4.9)
ALBUMIN/GLOB SERPL: 1.1 G/DL
ALP SERPL-CCNC: 82 U/L (ref 30–99)
ALT SERPL-CCNC: 25 U/L (ref 2–50)
ANION GAP SERPL CALC-SCNC: 11 MMOL/L (ref 7–16)
AST SERPL-CCNC: 24 U/L (ref 12–45)
BASOPHILS # BLD AUTO: 0.5 % (ref 0–1.8)
BASOPHILS # BLD: 0.03 K/UL (ref 0–0.12)
BILIRUB SERPL-MCNC: 0.6 MG/DL (ref 0.1–1.5)
BUN SERPL-MCNC: 19 MG/DL (ref 8–22)
CALCIUM SERPL-MCNC: 9.1 MG/DL (ref 8.5–10.5)
CHLORIDE SERPL-SCNC: 102 MMOL/L (ref 96–112)
CHOLEST SERPL-MCNC: 149 MG/DL (ref 100–199)
CO2 SERPL-SCNC: 29 MMOL/L (ref 20–33)
CREAT SERPL-MCNC: 1.01 MG/DL (ref 0.5–1.4)
CREAT UR-MCNC: 83.77 MG/DL
EOSINOPHIL # BLD AUTO: 0.26 K/UL (ref 0–0.51)
EOSINOPHIL NFR BLD: 4 % (ref 0–6.9)
ERYTHROCYTE [DISTWIDTH] IN BLOOD BY AUTOMATED COUNT: 45.4 FL (ref 35.9–50)
EST. AVERAGE GLUCOSE BLD GHB EST-MCNC: 131 MG/DL
FASTING STATUS PATIENT QL REPORTED: NORMAL
GLOBULIN SER CALC-MCNC: 3.4 G/DL (ref 1.9–3.5)
GLUCOSE SERPL-MCNC: 87 MG/DL (ref 65–99)
HBA1C MFR BLD: 6.2 % (ref 0–5.6)
HCT VFR BLD AUTO: 49.3 % (ref 42–52)
HDLC SERPL-MCNC: 38 MG/DL
HGB BLD-MCNC: 16.5 G/DL (ref 14–18)
IMM GRANULOCYTES # BLD AUTO: 0.02 K/UL (ref 0–0.11)
IMM GRANULOCYTES NFR BLD AUTO: 0.3 % (ref 0–0.9)
LDLC SERPL CALC-MCNC: 88 MG/DL
LYMPHOCYTES # BLD AUTO: 1.94 K/UL (ref 1–4.8)
LYMPHOCYTES NFR BLD: 30.1 % (ref 22–41)
MCH RBC QN AUTO: 29.8 PG (ref 27–33)
MCHC RBC AUTO-ENTMCNC: 33.5 G/DL (ref 33.7–35.3)
MCV RBC AUTO: 89 FL (ref 81.4–97.8)
MICROALBUMIN UR-MCNC: 3 MG/DL
MICROALBUMIN/CREAT UR: 36 MG/G (ref 0–30)
MONOCYTES # BLD AUTO: 0.59 K/UL (ref 0–0.85)
MONOCYTES NFR BLD AUTO: 9.1 % (ref 0–13.4)
NEUTROPHILS # BLD AUTO: 3.61 K/UL (ref 1.82–7.42)
NEUTROPHILS NFR BLD: 56 % (ref 44–72)
NRBC # BLD AUTO: 0 K/UL
NRBC BLD-RTO: 0 /100 WBC
PLATELET # BLD AUTO: 161 K/UL (ref 164–446)
PMV BLD AUTO: 11 FL (ref 9–12.9)
POTASSIUM SERPL-SCNC: 4.1 MMOL/L (ref 3.6–5.5)
PROT SERPL-MCNC: 7.3 G/DL (ref 6–8.2)
RBC # BLD AUTO: 5.54 M/UL (ref 4.7–6.1)
SODIUM SERPL-SCNC: 142 MMOL/L (ref 135–145)
T4 FREE SERPL-MCNC: 0.97 NG/DL (ref 0.93–1.7)
TRIGL SERPL-MCNC: 113 MG/DL (ref 0–149)
TSH SERPL DL<=0.005 MIU/L-ACNC: 15.4 UIU/ML (ref 0.38–5.33)
WBC # BLD AUTO: 6.5 K/UL (ref 4.8–10.8)

## 2020-08-18 PROCEDURE — 82043 UR ALBUMIN QUANTITATIVE: CPT

## 2020-08-18 PROCEDURE — 80061 LIPID PANEL: CPT

## 2020-08-18 PROCEDURE — 36415 COLL VENOUS BLD VENIPUNCTURE: CPT

## 2020-08-18 PROCEDURE — 84443 ASSAY THYROID STIM HORMONE: CPT

## 2020-08-18 PROCEDURE — 82306 VITAMIN D 25 HYDROXY: CPT

## 2020-08-18 PROCEDURE — 83036 HEMOGLOBIN GLYCOSYLATED A1C: CPT | Mod: GA

## 2020-08-18 PROCEDURE — 84439 ASSAY OF FREE THYROXINE: CPT

## 2020-08-18 PROCEDURE — 82570 ASSAY OF URINE CREATININE: CPT

## 2020-08-18 PROCEDURE — 80053 COMPREHEN METABOLIC PANEL: CPT

## 2020-08-18 PROCEDURE — 85025 COMPLETE CBC W/AUTO DIFF WBC: CPT

## 2020-08-21 ENCOUNTER — OFFICE VISIT (OUTPATIENT)
Dept: MEDICAL GROUP | Facility: PHYSICIAN GROUP | Age: 60
End: 2020-08-21
Payer: MEDICARE

## 2020-08-21 VITALS
OXYGEN SATURATION: 93 % | BODY MASS INDEX: 34.4 KG/M2 | DIASTOLIC BLOOD PRESSURE: 64 MMHG | WEIGHT: 227 LBS | TEMPERATURE: 98.5 F | SYSTOLIC BLOOD PRESSURE: 126 MMHG | HEART RATE: 77 BPM | HEIGHT: 68 IN

## 2020-08-21 DIAGNOSIS — I10 ESSENTIAL HYPERTENSION: ICD-10-CM

## 2020-08-21 DIAGNOSIS — I10 BENIGN ESSENTIAL HTN: ICD-10-CM

## 2020-08-21 DIAGNOSIS — E11.9 DIABETES MELLITUS WITHOUT COMPLICATION (HCC): ICD-10-CM

## 2020-08-21 DIAGNOSIS — R35.1 BENIGN PROSTATIC HYPERPLASIA WITH NOCTURIA: ICD-10-CM

## 2020-08-21 DIAGNOSIS — K74.60 HEPATIC CIRRHOSIS, UNSPECIFIED HEPATIC CIRRHOSIS TYPE, UNSPECIFIED WHETHER ASCITES PRESENT (HCC): ICD-10-CM

## 2020-08-21 DIAGNOSIS — N40.1 BENIGN PROSTATIC HYPERPLASIA WITH NOCTURIA: ICD-10-CM

## 2020-08-21 DIAGNOSIS — C14.0 MALIGNANT NEOPLASM OF PHARYNX (HCC): ICD-10-CM

## 2020-08-21 DIAGNOSIS — E03.9 ACQUIRED HYPOTHYROIDISM: ICD-10-CM

## 2020-08-21 DIAGNOSIS — E11.9 TYPE 2 DIABETES MELLITUS WITHOUT COMPLICATION, WITHOUT LONG-TERM CURRENT USE OF INSULIN (HCC): ICD-10-CM

## 2020-08-21 DIAGNOSIS — C01 MALIGNANT NEOPLASM OF BASE OF TONGUE (HCC): ICD-10-CM

## 2020-08-21 PROCEDURE — 99214 OFFICE O/P EST MOD 30 MIN: CPT | Performed by: FAMILY MEDICINE

## 2020-08-21 RX ORDER — LEVOTHYROXINE SODIUM 0.1 MG/1
100 TABLET ORAL
Qty: 90 TAB | Refills: 3 | Status: SHIPPED | OUTPATIENT
Start: 2020-08-21 | End: 2020-10-12

## 2020-08-21 RX ORDER — AMLODIPINE BESYLATE 10 MG/1
10 TABLET ORAL
Qty: 90 TAB | Refills: 3 | Status: SHIPPED | OUTPATIENT
Start: 2020-08-21

## 2020-08-21 RX ORDER — LEVOTHYROXINE SODIUM 0.1 MG/1
100 TABLET ORAL
Qty: 30 TAB | Refills: 11 | Status: SHIPPED | OUTPATIENT
Start: 2020-08-21 | End: 2020-08-21 | Stop reason: SDUPTHER

## 2020-08-21 RX ORDER — LISINOPRIL 20 MG/1
20 TABLET ORAL DAILY
Qty: 90 TAB | Refills: 3 | Status: SHIPPED | OUTPATIENT
Start: 2020-08-21 | End: 2021-10-08

## 2020-08-21 RX ORDER — TAMSULOSIN HYDROCHLORIDE 0.4 MG/1
0.4 CAPSULE ORAL DAILY
Qty: 90 CAP | Refills: 3 | Status: SHIPPED | OUTPATIENT
Start: 2020-08-21 | End: 2020-10-08 | Stop reason: SDUPTHER

## 2020-08-21 ASSESSMENT — PATIENT HEALTH QUESTIONNAIRE - PHQ9: CLINICAL INTERPRETATION OF PHQ2 SCORE: 0

## 2020-08-21 ASSESSMENT — FIBROSIS 4 INDEX: FIB4 SCORE: 1.76

## 2020-08-21 NOTE — PROGRESS NOTES
Monofilament testing with a 10 gram force: sensation intact: decreased bilaterally  Visual Inspection: Feet without maceration, ulcers, fissures.  Pedal pulses: intact bilaterally

## 2020-08-21 NOTE — PROGRESS NOTES
CC: Hypothyroidism    HISTORY OF THE PRESENT ILLNESS: Patient is a 60 y.o. male. This pleasant patient is here today to follow-up.    Patient is primarily here today for follow-up.  He has not been seen for quite some time in spite of his multiple health comorbidities.  He did recently obtain lab work.  Recent lab work showed elevated TSH with low normal T4.  He does endorse fatigue and constipation.  Open to going up on dose of levothyroxine.    His diabetes is well controlled off medications.  He states that he does not eat a healthy diet and eats a lot of cookies.  Recent hemoglobin A1c was 6.2.    He does have known cirrhosis and liver is quite palpably enlarged today.  He does not follow with gastroenterology.  He has not had any recent imaging of his liver.  He declines referral and declines ultrasound of liver.    Hypertension is well controlled on lisinopril and amlodipine.  Denies any side effects from the medications.    Continues to urinate without issue on Flomax due to his BPH.    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  Reports he followed with oncology for 5 years and they cleared him as he has been cancer free since that time.  He does have significant hardening in his neck area, but he reports it has been stable since his treatment with radiation.    Allergies: Patient has no known allergies.    Current Outpatient Medications Ordered in Epic   Medication Sig Dispense Refill   • lisinopril (PRINIVIL) 20 MG Tab Take 1 Tab by mouth every day. 90 Tab 3   • tamsulosin (FLOMAX) 0.4 MG capsule Take 1 Cap by mouth every day. Pt to make appt prior to more refills. 90 Cap 3   • amLODIPine (NORVASC) 10 MG Tab Take 1 Tab by mouth every day. 90 Tab 3   • levothyroxine (SYNTHROID) 100 MCG Tab Take 1 Tab by mouth Every morning on an empty stomach. 90 Tab 3   • Magnesium Hydroxide (MILK OF MAGNESIA PO) Take  by mouth.     • Blood Glucose Test Strips Test sugar daily. 100 Strip 2   • oxycodone (OXY-IR)  15 MG immediate release tablet        No current Epic-ordered facility-administered medications on file.        Past Medical History:   Diagnosis Date   • Anxiety    • Backpain 2001    lower back pain   • Blood transfusion without reported diagnosis    • Cancer (HCC)    • CATARACT oct 24 2013    righteye cataract surgery   • CHF (congestive heart failure) ([facility])    • COPD (chronic obstructive pulmonary disease) MILD 7/5/2011    home o2 3 liters at evening   • CVA (cerebral infarction)     10/31/2012 /right facial droop and slurredspeech/ right leg weakness   • Dental disorder     dentures   • Depression    • Diabetes 2007    possible   • GERD (gastroesophageal reflux disease)    • Hyperlipidemia    • Hypertension 2010   • Hypertr obst cardiomyop    • PAF (paroxysmal atrial fibrillation) ([facility]) 10/30/2013   • Psychiatric problem     history of meth use   • Sleep apnea        Past Surgical History:   Procedure Laterality Date   • LARYNGOSCOPY N/A 8/12/2015    Procedure: LARYNGOSCOPY ;  Surgeon: Cesar Wood M.D.;  Location: SURGERY SAME DAY [facility];  Service:    • ESOPHAGOSCOPY N/A 8/12/2015    Procedure: ESOPHAGOSCOPY ;  Surgeon: Cesar Wood M.D.;  Location: SURGERY SAME DAY [facility];  Service:    • CATH PLACEMENT  10/20/2014    Performed by Christina Frias M.D. at SURGERY [facility]   • GASTROSTOMY LAPAROSCOPIC  10/11/2014    Performed by Christina Frias M.D. at SURGERY [facility]   • TRACHEOSTOMY  10/11/2014    Performed by Christina Frias M.D. at SURGERY [facility]   • LARYNGOSCOPY  10/8/2014    Performed by Cesar Wood M.D. at SURGERY SAME DAY [facility]   • ESOPHAGOSCOPY  10/8/2014    Performed by Cesar Wood M.D. at SURGERY SAME DAY [facility]S   • BIOPSY GENERAL  10/8/2014    Performed by Cesar Wood M.D. at SURGERY SAME DAY [facility]   • TRACHEOSTOMY  10/8/2014    Performed by Cesar Wood M.D. at SURGERY SAME DAY [facility]   • CUONG BY LAPAROSCOPY  " 2008   • CHOLECYSTECTOMY     • OTHER      right cataract       Social History     Tobacco Use   • Smoking status: Never Smoker   • Smokeless tobacco: Never Used   Substance Use Topics   • Alcohol use: No   • Drug use: No     Types: Methamphetamines, Cocaine, Marijuana     Comment: meth, marijuana, cocaine use 25yrs, quit 10/2010       Social History     Social History Narrative   • Not on file       Family History   Problem Relation Age of Onset   • Stroke Mother    • Lung Disease Neg Hx    • Cancer Neg Hx    • Diabetes Neg Hx    • Heart Disease Neg Hx        ROS:     - Constitutional: Positive for fatigue.  Negative for fever, chills, unexpected weight change, and generalized weakness.     - HEENT: Negative for headaches, vision changes, hearing changes, ear pain, ear discharge, rhinorrhea, sinus congestion, sore throat, and neck pain.      - Respiratory: Negative for cough, sputum production, chest congestion, dyspnea, wheezing, and crackles.      - Cardiovascular: Negative for chest pain, palpitations, orthopnea, PND, and bilateral lower extremity edema.     - Gastrointestinal: Positive for constipation due to chronic opioid use per pain management.  Negative for heartburn, nausea, vomiting, abdominal pain, hematochezia, melena, diarrhea,  and greasy/foul-smelling stools.     - Genitourinary: Negative for dysuria, polyuria, hematuria, pyuria, urinary urgency, and urinary incontinence.     Labs: Labs reviewed from August 18, 2020 and questions answered with patient.    Exam: /64 (BP Location: Right arm, Patient Position: Sitting, BP Cuff Size: Large adult)   Pulse 77   Temp 36.9 °C (98.5 °F) (Temporal)   Ht 1.727 m (5' 8\")   Wt 103 kg (227 lb)   SpO2 93%  Body mass index is 34.52 kg/m².    General: Well appearing, NAD  HEENT: Normocephalic. Conjunctiva clear, lids without ptosis, pupils equal and reactive to light accommodation, ears normal shape and contour, canals are clear bilaterally, tympanic " membranes without bulging or erythema and normal cone of light, oropharynx is without erythema, edema or exudates.   Neck: Submandibular and sub-mental area of neck is uniformly hard due to patient reported post radiation changes.  No palpable lymphadenopathy, masses.  Pulmonary: Clear to ausculation.  Normal effort. No rales, ronchi, or wheezing.  Cardiovascular: Regular rate and rhythm without murmur, rubs or gallop.   Abdomen: Soft, nontender, nondistended. Normal bowel sounds.  Liver is enlarged.  No rebound or guarding  Neurologic: normal gait  Skin: Warm and dry.  No obvious lesions.  Musculoskeletal:  No extremity cyanosis, clubbing, or edema.  Psych: Normal mood and affect. Alert and oriented. Judgment and insight is normal.    Please note that this dictation was created using voice recognition software. I have made every reasonable attempt to correct obvious errors, but I expect that there are errors of grammar and possibly content that I did not discover before finalizing the note.      Assessment/Plan  Neptali was seen today for diabetes follow-up and medication refill.    Diagnoses and all orders for this visit:    Type 2 diabetes mellitus without complication, without long-term current use of insulin (HCC)  Chronic, well controlled off medication.  Patient does not abide by diabetic diet.  -     Diabetic Monofilament Lower Extremity Exam    Acquired hypothyroidism  Chronic issue, appears as if he needs higher dose of levothyroxine at this time.  I have gone ahead and increased him to 100 mcg.  Recommend recheck 6 weeks from now which has been ordered.  -     TSH+FREE T4  -     levothyroxine (SYNTHROID) 100 MCG Tab; Take 1 Tab by mouth Every morning on an empty stomach.    Benign prostatic hyperplasia with nocturia  Chronic issue, reportedly well controlled on Flomax.  -     tamsulosin (FLOMAX) 0.4 MG capsule; Take 1 Cap by mouth every day. Pt to make appt prior to more refills.    Benign essential  HTN  Chronic issue, well controlled on current medications.  -     lisinopril (PRINIVIL) 20 MG Tab; Take 1 Tab by mouth every day.  -     amLODIPine (NORVASC) 10 MG Tab; Take 1 Tab by mouth every day.    Malignant neoplasm of base of tongue (HCC)  Malignant neoplasm of pharynx (HCC)  Previous issue, follows with oncology for 5 years and reports that he was cleared to no longer follow-up with them.  Denies any recent symptoms such as hoarse voice, lumps in mouth, difficulty swallowing.    Hepatic cirrhosis, unspecified hepatic cirrhosis type, unspecified whether ascites present (HCC)  Chronic issue, liver is quite enlarged on exam today.  I did recommend that he get in with a gastroenterologist for monitoring of his cirrhosis but he declined.  I also offered ultrasound which would be recommended to screen for cancer in the setting of cirrhosis, but again he declined.  We will continue to monitor.    Follow-up in about 4 to 6 months.    Louisa Garvey,   [facility]

## 2020-10-08 ENCOUNTER — TELEPHONE (OUTPATIENT)
Dept: MEDICAL GROUP | Facility: PHYSICIAN GROUP | Age: 60
End: 2020-10-08

## 2020-10-08 ENCOUNTER — HOSPITAL ENCOUNTER (OUTPATIENT)
Dept: LAB | Facility: MEDICAL CENTER | Age: 60
End: 2020-10-08
Attending: FAMILY MEDICINE
Payer: MEDICARE

## 2020-10-08 DIAGNOSIS — R35.1 BENIGN PROSTATIC HYPERPLASIA WITH NOCTURIA: ICD-10-CM

## 2020-10-08 DIAGNOSIS — N40.1 BENIGN PROSTATIC HYPERPLASIA WITH NOCTURIA: ICD-10-CM

## 2020-10-08 LAB
T4 FREE SERPL-MCNC: 1.4 NG/DL (ref 0.93–1.7)
TSH SERPL DL<=0.005 MIU/L-ACNC: 6.56 UIU/ML (ref 0.38–5.33)

## 2020-10-08 PROCEDURE — 84439 ASSAY OF FREE THYROXINE: CPT

## 2020-10-08 PROCEDURE — 36415 COLL VENOUS BLD VENIPUNCTURE: CPT

## 2020-10-08 PROCEDURE — 84443 ASSAY THYROID STIM HORMONE: CPT

## 2020-10-08 RX ORDER — TAMSULOSIN HYDROCHLORIDE 0.4 MG/1
0.4 CAPSULE ORAL DAILY
Qty: 90 CAP | Refills: 1 | Status: SHIPPED | OUTPATIENT
Start: 2020-10-08 | End: 2020-10-19 | Stop reason: SDUPTHER

## 2020-10-08 NOTE — TELEPHONE ENCOUNTER
1. Name: Neptali Peters        Call Back Number: [phone]        How would the patient prefer to be contacted with a response: Phone    2. Which medication(s) is being requested? Tamsulosin Hcl 0.4 mg    3. What is the preferred Pharmacy? [facility] on [address]    Patient was informed they may receive a return phone call from our office with any additional questions before processing this request.

## 2020-10-12 ENCOUNTER — TELEPHONE (OUTPATIENT)
Dept: MEDICAL GROUP | Facility: PHYSICIAN GROUP | Age: 60
End: 2020-10-12

## 2020-10-12 DIAGNOSIS — E03.9 HYPOTHYROIDISM, UNSPECIFIED TYPE: ICD-10-CM

## 2020-10-12 RX ORDER — LEVOTHYROXINE SODIUM 0.12 MG/1
125 TABLET ORAL
Qty: 30 TAB | Refills: 5 | Status: SHIPPED | OUTPATIENT
Start: 2020-10-12

## 2020-10-12 NOTE — TELEPHONE ENCOUNTER
Patient notified via phone.    He is ok with the increased dosage and does acknowledge that he should get lab work again 6 weeks after starting the new dose of medication.

## 2020-10-12 NOTE — TELEPHONE ENCOUNTER
----- Message from Louisa Garvey D.O. sent at 10/9/2020  6:23 PM PDT -----  Please let patient know thyroid function has improved on his higher dose of levothyroxine.  However, his labs show that he still has low thyroid.  I would like to send a higher dose of levothyroxine to the pharmacy for him.  Please let me know if he is open to this.  I will require him to get his thyroid checked again in 6 weeks after starting the new dose.

## 2020-10-19 ENCOUNTER — OFFICE VISIT (OUTPATIENT)
Dept: MEDICAL GROUP | Facility: PHYSICIAN GROUP | Age: 60
End: 2020-10-19
Payer: MEDICARE

## 2020-10-19 VITALS
BODY MASS INDEX: 31.98 KG/M2 | SYSTOLIC BLOOD PRESSURE: 128 MMHG | WEIGHT: 211 LBS | HEIGHT: 68 IN | OXYGEN SATURATION: 94 % | HEART RATE: 66 BPM | DIASTOLIC BLOOD PRESSURE: 70 MMHG | TEMPERATURE: 99.2 F

## 2020-10-19 DIAGNOSIS — R35.1 BENIGN PROSTATIC HYPERPLASIA WITH NOCTURIA: ICD-10-CM

## 2020-10-19 DIAGNOSIS — Z02.89 ENCOUNTER FOR COMPLETION OF FORM WITH PATIENT: ICD-10-CM

## 2020-10-19 DIAGNOSIS — E03.9 ACQUIRED HYPOTHYROIDISM: ICD-10-CM

## 2020-10-19 DIAGNOSIS — N40.1 BENIGN PROSTATIC HYPERPLASIA WITH NOCTURIA: ICD-10-CM

## 2020-10-19 PROCEDURE — 99214 OFFICE O/P EST MOD 30 MIN: CPT | Performed by: FAMILY MEDICINE

## 2020-10-19 RX ORDER — TAMSULOSIN HYDROCHLORIDE 0.4 MG/1
0.8 CAPSULE ORAL DAILY
Qty: 180 CAP | Refills: 1 | Status: SHIPPED | OUTPATIENT
Start: 2020-10-19 | End: 2021-07-06

## 2020-10-19 ASSESSMENT — FIBROSIS 4 INDEX: FIB4 SCORE: 1.76

## 2020-10-19 NOTE — PROGRESS NOTES
CC: RTC bus access paperwork    HISTORY OF THE PRESENT ILLNESS: Patient is a 60 y.o. male. This pleasant patient is here today to discuss following issues.    Patient is here today to get his RTC bus access paperwork filled out.  He has known COPD, CHF, severe lumbar degenerative disc disease and lumbar spondylosis.  He is unable to ambulate to his regular bus stop, as it is over 2 miles away.  He also is unable to drive.  He does take oxycodone, which is an impairing medication.    Patient also wondering if he can increase dose of Flomax.  Notes that his stream is not as strong as he would like it to be.  And sometimes he has some dribbling and hesitancy.    Also recently noted to have thyroid labs which remained in hypothyroid range.  Recently increase levothyroxine though patient has not picked it up yet.  Reminded patient that we will again need repeat lab work.    Allergies: Patient has no known allergies.    Current Outpatient Medications Ordered in Epic   Medication Sig Dispense Refill   • tamsulosin (FLOMAX) 0.4 MG capsule Take 2 Caps by mouth every day. 180 Cap 1   • levothyroxine (SYNTHROID) 125 MCG Tab Take 1 Tab by mouth Every morning on an empty stomach. 30 Tab 5   • lisinopril (PRINIVIL) 20 MG Tab Take 1 Tab by mouth every day. 90 Tab 3   • amLODIPine (NORVASC) 10 MG Tab Take 1 Tab by mouth every day. 90 Tab 3   • Magnesium Hydroxide (MILK OF MAGNESIA PO) Take  by mouth.     • Blood Glucose Test Strips Test sugar daily. 100 Strip 2   • oxycodone (OXY-IR) 15 MG immediate release tablet        No current Epic-ordered facility-administered medications on file.        Past Medical History:   Diagnosis Date   • Anxiety    • Backpain 2001    lower back pain   • Blood transfusion without reported diagnosis    • Cancer (HCC)    • CATARACT oct 24 2013    righteye cataract surgery   • CHF (congestive heart failure) (HCC)    • COPD (chronic obstructive pulmonary disease) MILD 7/5/2011    home o2 3 liters at  evening   • CVA (cerebral infarction)     10/31/2012 /right facial droop and slurredspeech/ right leg weakness   • Dental disorder     dentures   • Depression    • Diabetes 2007    possible   • GERD (gastroesophageal reflux disease)    • Hyperlipidemia    • Hypertension 2010   • Hypertr obst cardiomyop    • PAF (paroxysmal atrial fibrillation) ([facility]) 10/30/2013   • Psychiatric problem     history of meth use   • Sleep apnea        Past Surgical History:   Procedure Laterality Date   • LARYNGOSCOPY N/A 8/12/2015    Procedure: LARYNGOSCOPY ;  Surgeon: Cesar Wood M.D.;  Location: SURGERY SAME DAY [facility]S;  Service:    • ESOPHAGOSCOPY N/A 8/12/2015    Procedure: ESOPHAGOSCOPY ;  Surgeon: Cesar Wood M.D.;  Location: SURGERY SAME DAY [facility];  Service:    • CATH PLACEMENT  10/20/2014    Performed by Christina Frias M.D. at SURGERY [facility]   • GASTROSTOMY LAPAROSCOPIC  10/11/2014    Performed by Christina Frias M.D. at SURGERY [facility]S   • TRACHEOSTOMY  10/11/2014    Performed by Christina Frias M.D. at SURGERY [facility]   • LARYNGOSCOPY  10/8/2014    Performed by Cesar Wood M.D. at SURGERY SAME DAY [facility] ORS   • ESOPHAGOSCOPY  10/8/2014    Performed by Cesar Wood M.D. at SURGERY SAME DAY [facility] ORS   • BIOPSY GENERAL  10/8/2014    Performed by Cesar Wood M.D. at SURGERY SAME DAY [facility] ORS   • TRACHEOSTOMY  10/8/2014    Performed by Cesar Wood M.D. at SURGERY SAME DAY [facility] ORS   • CUONG BY LAPAROSCOPY  2008   • CHOLECYSTECTOMY     • OTHER      right cataract       Social History     Tobacco Use   • Smoking status: Never Smoker   • Smokeless tobacco: Never Used   Substance Use Topics   • Alcohol use: No   • Drug use: No     Types: Methamphetamines, Cocaine, Marijuana     Comment: meth, marijuana, cocaine use 25yrs, quit 10/2010       Social History     Social History Narrative   • Not on file       Family History   Problem Relation Age of Onset  "  • Stroke Mother    • Lung Disease Neg Hx    • Cancer Neg Hx    • Diabetes Neg Hx    • Heart Disease Neg Hx        ROS:     - Constitutional: Negative for fever, chills, unexpected weight change, and fatigue/generalized weakness.     - Respiratory: Negative for cough, sputum production, chest congestion, dyspnea, wheezing, and crackles.      - Cardiovascular: Negative for chest pain, palpitations, orthopnea, PND, and bilateral lower extremity edema.     Exam: /70 (BP Location: Right arm, Patient Position: Sitting, BP Cuff Size: Large adult)   Pulse 66   Temp 37.3 °C (99.2 °F) (Temporal)   Ht 1.727 m (5' 8\")   Wt 95.7 kg (211 lb)   SpO2 94%  Body mass index is 32.08 kg/m².    General: Well appearing, NAD  Skin: Warm and dry.  No obvious lesions.  Musculoskeletal:  No extremity cyanosis, clubbing, or edema.  Psych: Normal mood and affect. Alert and oriented. Judgment and insight is normal.    Please note that this dictation was created using voice recognition software. I have made every reasonable attempt to correct obvious errors, but I expect that there are errors of grammar and possibly content that I did not discover before finalizing the note.      Assessment/Plan  Neptali was seen today for paperwork.    Diagnoses and all orders for this visit:    Benign prostatic hyperplasia with nocturia  Chronic issue, okay to increase Flomax to 0.8 mg daily.  -     tamsulosin (FLOMAX) 0.4 MG capsule; Take 2 Caps by mouth every day.    Encounter for completion of form with patient  RTC past access paperwork filled out during today's visit based on health conditions as above.    Acquired hypothyroidism  Chronic issue, remains uncontrolled.  Recently increase levothyroxine.  Reminded patient to get labs checked 6 weeks after starting higher dose.    Follow-up in 6 months or sooner if needed.    Louisa Garvey, DO  [facility]      "

## 2021-07-04 DIAGNOSIS — N40.1 BENIGN PROSTATIC HYPERPLASIA WITH NOCTURIA: ICD-10-CM

## 2021-07-04 DIAGNOSIS — R35.1 BENIGN PROSTATIC HYPERPLASIA WITH NOCTURIA: ICD-10-CM

## 2021-07-06 RX ORDER — TAMSULOSIN HYDROCHLORIDE 0.4 MG/1
CAPSULE ORAL
Qty: 90 CAPSULE | Refills: 1 | Status: SHIPPED | OUTPATIENT
Start: 2021-07-06

## 2022-09-15 ENCOUNTER — TELEPHONE (OUTPATIENT)
Dept: HEALTH INFORMATION MANAGEMENT | Facility: OTHER | Age: 62
End: 2022-09-15

## 2022-09-16 NOTE — TELEPHONE ENCOUNTER
Outcome: Left Message for patient to schedule an AWV           Please transfer to [facility] at [phone] when patient returns call.                 Attempt # 1  - [place]